# Patient Record
Sex: MALE | Race: WHITE | NOT HISPANIC OR LATINO | Employment: PART TIME | URBAN - METROPOLITAN AREA
[De-identification: names, ages, dates, MRNs, and addresses within clinical notes are randomized per-mention and may not be internally consistent; named-entity substitution may affect disease eponyms.]

---

## 2024-02-13 ENCOUNTER — OFFICE VISIT (OUTPATIENT)
Dept: GASTROENTEROLOGY | Facility: CLINIC | Age: 64
End: 2024-02-13
Payer: COMMERCIAL

## 2024-02-13 VITALS
SYSTOLIC BLOOD PRESSURE: 136 MMHG | DIASTOLIC BLOOD PRESSURE: 79 MMHG | HEART RATE: 65 BPM | HEIGHT: 75 IN | OXYGEN SATURATION: 93 % | WEIGHT: 172 LBS | BODY MASS INDEX: 21.39 KG/M2

## 2024-02-13 DIAGNOSIS — R13.10 DYSPHAGIA, UNSPECIFIED TYPE: Primary | ICD-10-CM

## 2024-02-13 PROCEDURE — 99204 OFFICE O/P NEW MOD 45 MIN: CPT | Performed by: PHYSICIAN ASSISTANT

## 2024-02-13 RX ORDER — ESCITALOPRAM OXALATE 10 MG/1
15 TABLET ORAL DAILY
COMMUNITY

## 2024-02-13 RX ORDER — CHOLECALCIFEROL (VITAMIN D3) 50 MCG
1 TABLET ORAL DAILY
COMMUNITY
End: 2024-03-25 | Stop reason: WASHOUT

## 2024-02-13 RX ORDER — LEVOTHYROXINE SODIUM 100 UG/1
100 TABLET ORAL
COMMUNITY

## 2024-02-13 NOTE — PROGRESS NOTES
Subjective   Patient ID: Harvinder Rider is a 64 y.o. male who was referred by himself for Dysphagia (Colonoscopy done 1/16/2023 by Dr. Mcdonald - no prior EGDs/).    Patient's PCP is BILLY Montague    PMH: hypothryoidism, depression    HPI  Patient states that he has had trouble swallowing for his whole life, but significantly worsened over the past year. He states that when eating or drinking, he starts excessively coughing and feels like he can't get the food or liquid out of his mouth. He states that rarely does food or liquids get stuck in his esophagus. He denies a chronic cough or SOB. He has never had an EGD before. Denies unexplained weight loss, heartburn, N/V, abdominal pain, change in bowel habits, melena, hematochezia. He has never had an EGD before. He denies NSAID use. He reports that his mother had colon cancer. He has a significant family history of Parkinson's in his mother and a grandparent. He is a chronic smoker, most recently 3 PPD.     Prior GI evaluation:  Colonoscopy January 2023 with Dr. Mcdonald: 5 polyps removed (mixture of TA, hyperplastic, and one SSA). Internal hemorrhoids seen also. Repeat colonoscopy recommended in 3 years.    Review of Systems:  Constitutional: No reported fever, chills, or weight loss.  Skin: No reported icterus, lesions, or rash.  Eye: No reported itching, pain, vision changes.  Ear: No reported discharge, hearing loss, or pain.  Nose: No reported congestion, discharge, or epistaxis.  Mouth/throat: +chronic dysphagia  Resp: No reported cough, dyspnea, or wheezing.  Cardiovascular: No reported chest pain, lower extremity edema, or palpitations.   GI: No reported abdominal pain, diarrhea, constipation, change in bowel habits, nausea, or vomiting.  : No reported dysuria, hematuria, or frequency.  Neuro: No reported confusion, memory loss, headaches, or dizziness.  Psych: No reported anxiety, depression, or insomnia.  Musculoskeletal: No reported arthralgia, joint  swelling, or myalgias.  Heme/lymph: No reported easy bleeding or bruising, or swollen lymph nodes.  Endocrine: No reported cold/heat intolerance, polydipsia, or polyuria.     Medications:  Prior to Admission medications    Medication Sig Start Date End Date Taking? Authorizing Provider   cholecalciferol (Vitamin D-3) 50 MCG (2000 UT) tablet Take 1 tablet (2,000 Units) by mouth once daily.    Historical Provider, MD   escitalopram (Lexapro) 10 mg tablet Take 1.5 tablets (15 mg) by mouth once daily.    Historical Provider, MD   levothyroxine (Synthroid, Levoxyl) 100 mcg tablet Take 1 tablet (100 mcg) by mouth once daily in the morning. Take before meals. Take on an empty stomach.    Historical Provider, MD       Allergies:  Sulfamethoxazole-trimethoprim    Past Medical History:  He has no past medical history on file.    Past Surgical History:  He has no past surgical history on file.    Social History:  He reports that he has been smoking cigarettes. He does not have any smokeless tobacco history on file. He reports current alcohol use. He reports that he does not currently use drugs.    Objective   Physical exam:  Constitutional: Well developed, well nourished 64 y.o. year old in no acute distress.   Skin: Warm and dry. Normal turgor. No rash, ulcer, trauma, jaundice.   Eyes: Pupils symmetric and reactive to light.  Respiratory: Clear to auscultation bilaterally. No wheezes, rhonchi, or rales heard.  Cardiovascular: Regular rate and rhythm. S1 and S2 appreciated and normal. No murmur, rub, or gallop heard.   Edema: No edema noted.  GI: Normal bowel sounds. Soft, non-distended, non-tender. No rebound or guarding present. No hepatomegaly or splenomegaly appreciated. Abdominal aorta not palpably abnormal.  Musculoskeletal: Limbs and Joints grossly normal. Full range of motion in major joint.   Neuro: Alert and oriented x 3. Cranial nerves 2-12 grossly intact.   Psych: Normal mood and affect.        Relevant Results  "Recent labs reviewed in the EMR.  Lab Results   Component Value Date    HGB 14.8 07/20/2020    MCV 92 07/20/2020     07/20/2020       Lab Results   Component Value Date    IRON 108 07/20/2020       Lab Results   Component Value Date     07/20/2020    K 4.4 07/20/2020     07/20/2020    BUN 17 07/20/2020    CREATININE 1.15 07/20/2020       Lab Results   Component Value Date    BILITOT 0.6 07/20/2020     Lab Results   Component Value Date    ALT 14 07/20/2020    AST 20 07/20/2020    ALKPHOS 64 07/20/2020       No results found for: \"CRP\"    No results found for: \"CALPS\"    Radiology: Reviewed imaging reviewed in the EMR.  No results found.    Assessment/Plan   Problem List Items Addressed This Visit             ICD-10-CM    Dysphagia - Primary R13.10     Patient with chronic dysphagia, worse over the past 6-12 months. His description of dysphagia appears to be more oropharyngeal. However, he is a smoker and is at risk of Barretts esophagus or esophageal cancer. EGD recommended along with esophagram and modified barium swallow.          Relevant Orders    EGD    FL GI esophagram    FL GI esophagram         Lili Topete PA-C    "

## 2024-02-13 NOTE — PATIENT INSTRUCTIONS
Thank you for coming in for your appointment.    -Get Xrays of swallowing done  -Get EGD done with Dr. Mcdonald    Call with questions or concerns

## 2024-02-13 NOTE — ASSESSMENT & PLAN NOTE
Patient with chronic dysphagia, worse over the past 6-12 months. His description of dysphagia appears to be more oropharyngeal. However, he is a smoker and is at risk of Barretts esophagus or esophageal cancer. EGD recommended along with esophagram and modified barium swallow.

## 2024-02-27 ENCOUNTER — ANESTHESIA EVENT (OUTPATIENT)
Dept: GASTROENTEROLOGY | Facility: HOSPITAL | Age: 64
End: 2024-02-27
Payer: COMMERCIAL

## 2024-02-27 ENCOUNTER — HOSPITAL ENCOUNTER (OUTPATIENT)
Dept: GASTROENTEROLOGY | Facility: HOSPITAL | Age: 64
Discharge: HOME | End: 2024-02-27
Payer: COMMERCIAL

## 2024-02-27 ENCOUNTER — ANESTHESIA (OUTPATIENT)
Dept: GASTROENTEROLOGY | Facility: HOSPITAL | Age: 64
End: 2024-02-27
Payer: COMMERCIAL

## 2024-02-27 VITALS
BODY MASS INDEX: 21.39 KG/M2 | HEIGHT: 75 IN | SYSTOLIC BLOOD PRESSURE: 123 MMHG | TEMPERATURE: 97.2 F | DIASTOLIC BLOOD PRESSURE: 76 MMHG | HEART RATE: 60 BPM | RESPIRATION RATE: 20 BRPM | OXYGEN SATURATION: 98 % | WEIGHT: 172 LBS

## 2024-02-27 DIAGNOSIS — R13.10 DYSPHAGIA, UNSPECIFIED TYPE: Primary | ICD-10-CM

## 2024-02-27 PROCEDURE — 88305 TISSUE EXAM BY PATHOLOGIST: CPT | Performed by: PATHOLOGY

## 2024-02-27 PROCEDURE — 2500000005 HC RX 250 GENERAL PHARMACY W/O HCPCS: Performed by: NURSE ANESTHETIST, CERTIFIED REGISTERED

## 2024-02-27 PROCEDURE — 3700000002 HC GENERAL ANESTHESIA TIME - EACH INCREMENTAL 1 MINUTE

## 2024-02-27 PROCEDURE — 7100000010 HC PHASE TWO TIME - EACH INCREMENTAL 1 MINUTE

## 2024-02-27 PROCEDURE — 7100000009 HC PHASE TWO TIME - INITIAL BASE CHARGE

## 2024-02-27 PROCEDURE — 3700000001 HC GENERAL ANESTHESIA TIME - INITIAL BASE CHARGE

## 2024-02-27 PROCEDURE — 0753T DGTZ GLS MCRSCP SLD LEVEL IV: CPT | Mod: TC,PORLAB | Performed by: INTERNAL MEDICINE

## 2024-02-27 PROCEDURE — 2500000004 HC RX 250 GENERAL PHARMACY W/ HCPCS (ALT 636 FOR OP/ED): Performed by: NURSE ANESTHETIST, CERTIFIED REGISTERED

## 2024-02-27 PROCEDURE — 43239 EGD BIOPSY SINGLE/MULTIPLE: CPT | Performed by: INTERNAL MEDICINE

## 2024-02-27 RX ORDER — SODIUM CHLORIDE 9 MG/ML
INJECTION, SOLUTION INTRAVENOUS CONTINUOUS PRN
Status: DISCONTINUED | OUTPATIENT
Start: 2024-02-27 | End: 2024-02-27

## 2024-02-27 RX ORDER — SODIUM CHLORIDE 9 MG/ML
20 INJECTION, SOLUTION INTRAVENOUS CONTINUOUS
Status: CANCELLED | OUTPATIENT
Start: 2024-02-27

## 2024-02-27 RX ORDER — FENTANYL CITRATE 50 UG/ML
INJECTION, SOLUTION INTRAMUSCULAR; INTRAVENOUS AS NEEDED
Status: DISCONTINUED | OUTPATIENT
Start: 2024-02-27 | End: 2024-02-27

## 2024-02-27 RX ORDER — LIDOCAINE HYDROCHLORIDE 20 MG/ML
INJECTION, SOLUTION INFILTRATION; PERINEURAL AS NEEDED
Status: DISCONTINUED | OUTPATIENT
Start: 2024-02-27 | End: 2024-02-27

## 2024-02-27 RX ORDER — PROPOFOL 10 MG/ML
INJECTION, EMULSION INTRAVENOUS AS NEEDED
Status: DISCONTINUED | OUTPATIENT
Start: 2024-02-27 | End: 2024-02-27

## 2024-02-27 RX ADMIN — LIDOCAINE HYDROCHLORIDE 80 MG: 20 INJECTION, SOLUTION INFILTRATION; PERINEURAL at 07:30

## 2024-02-27 RX ADMIN — FENTANYL CITRATE 50 MCG: 50 INJECTION INTRAMUSCULAR; INTRAVENOUS at 07:30

## 2024-02-27 RX ADMIN — SODIUM CHLORIDE: 0.9 INJECTION, SOLUTION INTRAVENOUS at 07:27

## 2024-02-27 RX ADMIN — PROPOFOL 80 MG: 10 INJECTION, EMULSION INTRAVENOUS at 07:30

## 2024-02-27 RX ADMIN — PROPOFOL 20 MG: 10 INJECTION, EMULSION INTRAVENOUS at 07:32

## 2024-02-27 SDOH — HEALTH STABILITY: MENTAL HEALTH: CURRENT SMOKER: 1

## 2024-02-27 ASSESSMENT — PAIN SCALES - GENERAL
PAINLEVEL_OUTOF10: 0 - NO PAIN
PAIN_LEVEL: 0
PAINLEVEL_OUTOF10: 0 - NO PAIN

## 2024-02-27 ASSESSMENT — PAIN - FUNCTIONAL ASSESSMENT: PAIN_FUNCTIONAL_ASSESSMENT: 0-10

## 2024-02-27 ASSESSMENT — COLUMBIA-SUICIDE SEVERITY RATING SCALE - C-SSRS
6. HAVE YOU EVER DONE ANYTHING, STARTED TO DO ANYTHING, OR PREPARED TO DO ANYTHING TO END YOUR LIFE?: NO
2. HAVE YOU ACTUALLY HAD ANY THOUGHTS OF KILLING YOURSELF?: NO
1. IN THE PAST MONTH, HAVE YOU WISHED YOU WERE DEAD OR WISHED YOU COULD GO TO SLEEP AND NOT WAKE UP?: NO

## 2024-02-27 NOTE — ANESTHESIA POSTPROCEDURE EVALUATION
"Patient: Harvinder Rider \"Darian\"    Procedure Summary       Date: 02/27/24 Room / Location: [facility]    Anesthesia Start: 0727 Anesthesia Stop: 0743    Procedure: EGD Diagnosis: Dysphagia, unspecified type    Scheduled Providers: Lan Mcdonald DO Responsible Provider: FLIP Brannon    Anesthesia Type: MAC ASA Status: 2            Anesthesia Type: MAC    Vitals Value Taken Time   /65 02/27/24 0740   Temp 36.2 °C (97.1 °F) 02/27/24 0740   Pulse 56 02/27/24 0740   Resp 12 02/27/24 0740   SpO2 99 % 02/27/24 0740       Anesthesia Post Evaluation    Patient location during evaluation: PACU  Patient participation: complete - patient cannot participate  Level of consciousness: responsive to verbal stimuli  Pain score: 0  Pain management: adequate  Airway patency: patent  Cardiovascular status: acceptable and hemodynamically stable  Respiratory status: acceptable, spontaneous ventilation and face mask  Hydration status: acceptable  Postoperative Nausea and Vomiting: none        There were no known notable events for this encounter.    "

## 2024-02-27 NOTE — ANESTHESIA PREPROCEDURE EVALUATION
"Patient: Harvinder Rider \"Darian\"    Procedure Information       Date/Time: 02/27/24 0730    Scheduled providers: Lan Mcdonald DO    Procedure: EGD    Location: [facility]            Relevant Problems   Anesthesia (within normal limits)       Clinical information reviewed:   Tobacco  Allergies  Meds   Med Hx  Surg Hx   Fam Hx  Soc Hx        NPO Detail:  NPO/Void Status  Date of Last Liquid: 02/27/24  Time of Last Liquid: 0430  Date of Last Solid: 02/26/24  Time of Last Solid: 1830  Last Intake Type: Clear fluids         Physical Exam    Airway  Mallampati: IV  TM distance: >3 FB  Neck ROM: full     Cardiovascular - normal exam     Dental   (+) upper dentures     Pulmonary - normal exam     Abdominal            Anesthesia Plan    History of general anesthesia?: yes  History of complications of general anesthesia?: no    ASA 2     MAC     The patient is a current smoker.  Patient was previously instructed to abstain from smoking on day of procedure.  Patient smoked on day of procedure.    intravenous induction   Anesthetic plan and risks discussed with patient.  Use of blood products discussed with patient who consented to blood products.    Plan discussed with CRNA.      "

## 2024-02-28 NOTE — ADDENDUM NOTE
Encounter addended by: Susan Lyman RN on: 2/28/2024 10:46 AM   Actions taken: Contacts section saved, Flowsheet accepted

## 2024-03-01 LAB
LABORATORY COMMENT REPORT: NORMAL
PATH REPORT.FINAL DX SPEC: NORMAL
PATH REPORT.GROSS SPEC: NORMAL
PATH REPORT.RELEVANT HX SPEC: NORMAL
PATH REPORT.TOTAL CANCER: NORMAL

## 2024-03-04 ENCOUNTER — APPOINTMENT (OUTPATIENT)
Dept: RADIOLOGY | Facility: HOSPITAL | Age: 64
End: 2024-03-04
Payer: COMMERCIAL

## 2024-03-11 ENCOUNTER — HOSPITAL ENCOUNTER (OUTPATIENT)
Dept: RADIOLOGY | Facility: HOSPITAL | Age: 64
Discharge: HOME | End: 2024-03-11
Payer: COMMERCIAL

## 2024-03-11 DIAGNOSIS — R13.10 DYSPHAGIA, UNSPECIFIED TYPE: ICD-10-CM

## 2024-03-11 PROCEDURE — 74220 X-RAY XM ESOPHAGUS 1CNTRST: CPT

## 2024-03-11 PROCEDURE — 2500000001 HC RX 250 WO HCPCS SELF ADMINISTERED DRUGS (ALT 637 FOR MEDICARE OP): Performed by: PHYSICIAN ASSISTANT

## 2024-03-11 PROCEDURE — 74220 X-RAY XM ESOPHAGUS 1CNTRST: CPT | Performed by: RADIOLOGY

## 2024-03-11 RX ADMIN — ANTACID/ANTIFLATULENT 1 PACKET: 380; 550; 10; 10 GRANULE, EFFERVESCENT ORAL at 10:30

## 2024-03-11 RX ADMIN — BARIUM SULFATE 115 ML: 1.05 SUSPENSION ORAL; RECTAL at 10:30

## 2024-03-11 RX ADMIN — BARIUM SULFATE 700 MG: 700 TABLET ORAL at 10:29

## 2024-03-15 DIAGNOSIS — R13.10 DYSPHAGIA, UNSPECIFIED TYPE: Primary | ICD-10-CM

## 2024-03-25 ENCOUNTER — OFFICE VISIT (OUTPATIENT)
Dept: GASTROENTEROLOGY | Facility: CLINIC | Age: 64
End: 2024-03-25
Payer: COMMERCIAL

## 2024-03-25 VITALS
SYSTOLIC BLOOD PRESSURE: 126 MMHG | BODY MASS INDEX: 22.13 KG/M2 | HEART RATE: 62 BPM | DIASTOLIC BLOOD PRESSURE: 84 MMHG | HEIGHT: 75 IN | WEIGHT: 178 LBS | OXYGEN SATURATION: 99 %

## 2024-03-25 DIAGNOSIS — R13.12 OROPHARYNGEAL DYSPHAGIA: Primary | ICD-10-CM

## 2024-03-25 PROCEDURE — 99213 OFFICE O/P EST LOW 20 MIN: CPT | Performed by: PHYSICIAN ASSISTANT

## 2024-03-25 RX ORDER — NICOTINE 11MG/24HR
2000 PATCH, TRANSDERMAL 24 HOURS TRANSDERMAL
COMMUNITY
Start: 2024-03-24

## 2024-03-25 RX ORDER — LIDOCAINE 40 MG/G
CREAM TOPICAL EVERY 8 HOURS
COMMUNITY

## 2024-03-25 NOTE — ASSESSMENT & PLAN NOTE
Patient's esophagus normal on EGD and esophagram showed only a small hiatal hernia. Patient did have single episode of aspiration with cough. I recommended referral to speech therapy for further evaluation. His dysphagia is more consistent with oropharyngeal than esophageal.

## 2024-03-25 NOTE — PROGRESS NOTES
"Subjective   Patient ID: Harvinder Rider \"Darian\" is a 64 y.o. male who presents for Follow-up (EGD 2/27/24).    Patient's PCP is BILLY Montague    PMH: hypothryoidism, depression    HPI  Patient was initially seen by myself in February 2024. Patient stated that he has had trouble swallowing for his whole life, but significantly worsened over the past year. He states that when eating or drinking, he starts excessively coughing and feels like he can't get the food or liquid out\" of his mouth. He stated that rarely does food or liquids get stuck in his esophagus. He denies a chronic cough or SOB. He denies NSAID use. He reports that his mother had colon cancer. He has a significant family history of Parkinson's in his mother and a grandparent. He is a chronic smoker, most recently 3 PPD.     Due to symptoms, I recommended an EGD for further evaluation that showed mild gastritis. Biopsies were negative for H. Pylor infection. He then had an esophagram and MBS. Esophagram showed just a small hiatal hernia. MBC showed a single episode of aspiration with cough. I had placed a speech therapy referral.     Patient states that he is about the same. He continues to have intermittent trouble swallowing both liquids and solids. Has not seen speech therapist yet. He has gained weight. Denies N/V, abdominal pain, diarrhea, constipation, melena, hematochezia.    Prior GI evaluation:  EGD 2/2024: mild gastritis seen  Colonoscopy January 2023 with Dr. Mcdonald: 5 polyps removed (mixture of TA, hyperplastic, and one SSA). Internal hemorrhoids seen also. Repeat colonoscopy recommended in 3 years.    Review of Systems:  Constitutional: No reported fever, chills, or weight loss.  Mouth/throat: +chronic dysphagia    Medications:  Prior to Admission medications    Medication Sig Start Date End Date Taking? Authorizing Provider   cholecalciferol (Vitamin D-3) 50 MCG (2000 UT) tablet Take 1 tablet (2,000 Units) by mouth once daily.    Historical " Provider, MD   escitalopram (Lexapro) 10 mg tablet Take 1.5 tablets (15 mg) by mouth once daily.    Historical Provider, MD   levothyroxine (Synthroid, Levoxyl) 100 mcg tablet Take 1 tablet (100 mcg) by mouth once daily in the morning. Take before meals. Take on an empty stomach.    Historical Provider, MD       Allergies:  Sulfamethoxazole-trimethoprim    Past Medical History:  He has no past medical history on file.    Past Surgical History:  He has no past surgical history on file.    Social History:  He reports that he has been smoking cigarettes. He has been smoking an average of .5 packs per day. He has never used smokeless tobacco. He reports current alcohol use. He reports that he does not currently use drugs.    Objective   Physical exam:  Constitutional: Well developed, well nourished 64 y.o. year old in no acute distress.   Skin: Warm and dry. Normal turgor. No rash, ulcer, trauma, jaundice.   Eyes: Pupils symmetric and reactive to light.  Respiratory: Clear to auscultation bilaterally. No wheezes, rhonchi, or rales heard.  Cardiovascular: Regular rate and rhythm. S1 and S2 appreciated and normal. No murmur, rub, or gallop heard.   Edema: No edema noted.  GI: Soft, non-distended, non-tender. No rebound or guarding present. No hepatomegaly or splenomegaly appreciated. Abdominal aorta not palpably abnormal.  Musculoskeletal: Limbs and Joints grossly normal. Full range of motion in major joint.   Neuro: Alert and oriented x 3. Cranial nerves 2-12 grossly intact.   Psych: Normal mood and affect.        Relevant Results Recent labs reviewed in the EMR.  Lab Results   Component Value Date    HGB 14.8 07/20/2020    MCV 92 07/20/2020     07/20/2020       Lab Results   Component Value Date    IRON 108 07/20/2020       Lab Results   Component Value Date     07/20/2020    K 4.4 07/20/2020     07/20/2020    BUN 17 07/20/2020    CREATININE 1.15 07/20/2020       Lab Results   Component Value Date     "BILITOT 0.6 07/20/2020     Lab Results   Component Value Date    ALT 14 07/20/2020    AST 20 07/20/2020    ALKPHOS 64 07/20/2020       No results found for: \"CRP\"    No results found for: \"CALPS\"    Radiology: Reviewed imaging reviewed in the EMR.  No results found.    Assessment/Plan   Problem List Items Addressed This Visit             ICD-10-CM    Dysphagia - Primary R13.10     Patient's esophagus normal on EGD and esophagram showed only a small hiatal hernia. Patient did have single episode of aspiration with cough. I recommended referral to speech therapy for further evaluation. His dysphagia is more consistent with oropharyngeal than esophageal.             Lili Topete PA-C    "

## 2024-03-25 NOTE — PATIENT INSTRUCTIONS
Thank you for coming in for your appointment.    -Your esophagus testing has been completely normal. However, you did have an episode of aspiration with thin liquids. I recommend speech therapy for further evaluation.

## 2024-04-02 ENCOUNTER — HOSPITAL ENCOUNTER (EMERGENCY)
Facility: HOSPITAL | Age: 64
Discharge: HOME | End: 2024-04-02
Attending: EMERGENCY MEDICINE
Payer: COMMERCIAL

## 2024-04-02 ENCOUNTER — APPOINTMENT (OUTPATIENT)
Dept: RADIOLOGY | Facility: HOSPITAL | Age: 64
End: 2024-04-02
Payer: COMMERCIAL

## 2024-04-02 ENCOUNTER — APPOINTMENT (OUTPATIENT)
Dept: CARDIOLOGY | Facility: HOSPITAL | Age: 64
End: 2024-04-02
Payer: COMMERCIAL

## 2024-04-02 VITALS
DIASTOLIC BLOOD PRESSURE: 79 MMHG | RESPIRATION RATE: 16 BRPM | SYSTOLIC BLOOD PRESSURE: 119 MMHG | HEIGHT: 75 IN | BODY MASS INDEX: 21.88 KG/M2 | TEMPERATURE: 97.8 F | HEART RATE: 62 BPM | OXYGEN SATURATION: 99 % | WEIGHT: 176 LBS

## 2024-04-02 DIAGNOSIS — K29.00 ACUTE GASTRITIS WITHOUT HEMORRHAGE, UNSPECIFIED GASTRITIS TYPE: Primary | ICD-10-CM

## 2024-04-02 LAB
ALBUMIN SERPL BCP-MCNC: 4.3 G/DL (ref 3.4–5)
ALP SERPL-CCNC: 70 U/L (ref 33–136)
ALT SERPL W P-5'-P-CCNC: 12 U/L (ref 10–52)
ANION GAP SERPL CALC-SCNC: 8 MMOL/L (ref 10–20)
AST SERPL W P-5'-P-CCNC: 21 U/L (ref 9–39)
BASOPHILS # BLD AUTO: 0.04 X10*3/UL (ref 0–0.1)
BASOPHILS NFR BLD AUTO: 0.5 %
BILIRUB SERPL-MCNC: 0.5 MG/DL (ref 0–1.2)
BUN SERPL-MCNC: 13 MG/DL (ref 6–23)
CALCIUM SERPL-MCNC: 9.2 MG/DL (ref 8.6–10.3)
CARDIAC TROPONIN I PNL SERPL HS: 5 NG/L (ref 0–20)
CARDIAC TROPONIN I PNL SERPL HS: 5 NG/L (ref 0–20)
CHLORIDE SERPL-SCNC: 104 MMOL/L (ref 98–107)
CO2 SERPL-SCNC: 29 MMOL/L (ref 21–32)
CREAT SERPL-MCNC: 1.13 MG/DL (ref 0.5–1.3)
EGFRCR SERPLBLD CKD-EPI 2021: 73 ML/MIN/1.73M*2
EOSINOPHIL # BLD AUTO: 0.16 X10*3/UL (ref 0–0.7)
EOSINOPHIL NFR BLD AUTO: 2.1 %
ERYTHROCYTE [DISTWIDTH] IN BLOOD BY AUTOMATED COUNT: 13.5 % (ref 11.5–14.5)
GLUCOSE SERPL-MCNC: 84 MG/DL (ref 74–99)
HCT VFR BLD AUTO: 45.4 % (ref 41–52)
HGB BLD-MCNC: 14.8 G/DL (ref 13.5–17.5)
IMM GRANULOCYTES # BLD AUTO: 0.04 X10*3/UL (ref 0–0.7)
IMM GRANULOCYTES NFR BLD AUTO: 0.5 % (ref 0–0.9)
LACTATE SERPL-SCNC: 0.8 MMOL/L (ref 0.4–2)
LIPASE SERPL-CCNC: 29 U/L (ref 9–82)
LYMPHOCYTES # BLD AUTO: 1.24 X10*3/UL (ref 1.2–4.8)
LYMPHOCYTES NFR BLD AUTO: 16 %
MCH RBC QN AUTO: 30.1 PG (ref 26–34)
MCHC RBC AUTO-ENTMCNC: 32.6 G/DL (ref 32–36)
MCV RBC AUTO: 93 FL (ref 80–100)
MONOCYTES # BLD AUTO: 0.79 X10*3/UL (ref 0.1–1)
MONOCYTES NFR BLD AUTO: 10.2 %
NEUTROPHILS # BLD AUTO: 5.5 X10*3/UL (ref 1.2–7.7)
NEUTROPHILS NFR BLD AUTO: 70.7 %
NRBC BLD-RTO: 0 /100 WBCS (ref 0–0)
PLATELET # BLD AUTO: 229 X10*3/UL (ref 150–450)
POTASSIUM SERPL-SCNC: 4.4 MMOL/L (ref 3.5–5.3)
PROT SERPL-MCNC: 6.7 G/DL (ref 6.4–8.2)
RBC # BLD AUTO: 4.91 X10*6/UL (ref 4.5–5.9)
SODIUM SERPL-SCNC: 137 MMOL/L (ref 136–145)
WBC # BLD AUTO: 7.8 X10*3/UL (ref 4.4–11.3)

## 2024-04-02 PROCEDURE — 2500000001 HC RX 250 WO HCPCS SELF ADMINISTERED DRUGS (ALT 637 FOR MEDICARE OP): Performed by: EMERGENCY MEDICINE

## 2024-04-02 PROCEDURE — 2500000004 HC RX 250 GENERAL PHARMACY W/ HCPCS (ALT 636 FOR OP/ED): Performed by: EMERGENCY MEDICINE

## 2024-04-02 PROCEDURE — 85025 COMPLETE CBC W/AUTO DIFF WBC: CPT | Performed by: NURSE PRACTITIONER

## 2024-04-02 PROCEDURE — 93005 ELECTROCARDIOGRAM TRACING: CPT

## 2024-04-02 PROCEDURE — 99285 EMERGENCY DEPT VISIT HI MDM: CPT | Mod: 25

## 2024-04-02 PROCEDURE — 83690 ASSAY OF LIPASE: CPT | Performed by: NURSE PRACTITIONER

## 2024-04-02 PROCEDURE — 36415 COLL VENOUS BLD VENIPUNCTURE: CPT | Performed by: NURSE PRACTITIONER

## 2024-04-02 PROCEDURE — 84075 ASSAY ALKALINE PHOSPHATASE: CPT | Performed by: NURSE PRACTITIONER

## 2024-04-02 PROCEDURE — 71046 X-RAY EXAM CHEST 2 VIEWS: CPT

## 2024-04-02 PROCEDURE — 2500000004 HC RX 250 GENERAL PHARMACY W/ HCPCS (ALT 636 FOR OP/ED): Performed by: NURSE PRACTITIONER

## 2024-04-02 PROCEDURE — 96375 TX/PRO/DX INJ NEW DRUG ADDON: CPT

## 2024-04-02 PROCEDURE — 84484 ASSAY OF TROPONIN QUANT: CPT | Performed by: NURSE PRACTITIONER

## 2024-04-02 PROCEDURE — 76705 ECHO EXAM OF ABDOMEN: CPT

## 2024-04-02 PROCEDURE — C9113 INJ PANTOPRAZOLE SODIUM, VIA: HCPCS | Performed by: EMERGENCY MEDICINE

## 2024-04-02 PROCEDURE — 76705 ECHO EXAM OF ABDOMEN: CPT | Performed by: RADIOLOGY

## 2024-04-02 PROCEDURE — 83605 ASSAY OF LACTIC ACID: CPT | Performed by: NURSE PRACTITIONER

## 2024-04-02 PROCEDURE — 96374 THER/PROPH/DIAG INJ IV PUSH: CPT

## 2024-04-02 RX ORDER — OMEPRAZOLE 20 MG/1
20 CAPSULE, DELAYED RELEASE ORAL DAILY
Qty: 90 CAPSULE | Refills: 3 | Status: SHIPPED | OUTPATIENT
Start: 2024-04-02 | End: 2024-05-06 | Stop reason: SDUPTHER

## 2024-04-02 RX ORDER — ONDANSETRON HYDROCHLORIDE 2 MG/ML
4 INJECTION, SOLUTION INTRAVENOUS ONCE
Status: COMPLETED | OUTPATIENT
Start: 2024-04-02 | End: 2024-04-02

## 2024-04-02 RX ORDER — FAMOTIDINE 10 MG/ML
20 INJECTION INTRAVENOUS ONCE
Status: COMPLETED | OUTPATIENT
Start: 2024-04-02 | End: 2024-04-02

## 2024-04-02 RX ORDER — PANTOPRAZOLE SODIUM 40 MG/10ML
40 INJECTION, POWDER, LYOPHILIZED, FOR SOLUTION INTRAVENOUS ONCE
Status: COMPLETED | OUTPATIENT
Start: 2024-04-02 | End: 2024-04-02

## 2024-04-02 RX ORDER — DICYCLOMINE HYDROCHLORIDE 20 MG/1
20 TABLET ORAL 2 TIMES DAILY
Qty: 20 TABLET | Refills: 0 | Status: SHIPPED | OUTPATIENT
Start: 2024-04-02 | End: 2024-04-12

## 2024-04-02 RX ADMIN — ALUMINUM HYDROXIDE, MAGNESIUM HYDROXIDE, AND DIMETHICONE 30 ML: 200; 20; 200 SUSPENSION ORAL at 18:05

## 2024-04-02 RX ADMIN — FAMOTIDINE 20 MG: 10 INJECTION, SOLUTION INTRAVENOUS at 15:28

## 2024-04-02 RX ADMIN — ONDANSETRON 4 MG: 2 INJECTION INTRAMUSCULAR; INTRAVENOUS at 15:29

## 2024-04-02 RX ADMIN — PANTOPRAZOLE SODIUM 40 MG: 40 INJECTION, POWDER, FOR SOLUTION INTRAVENOUS at 18:05

## 2024-04-02 ASSESSMENT — PAIN DESCRIPTION - PAIN TYPE: TYPE: ACUTE PAIN

## 2024-04-02 ASSESSMENT — COLUMBIA-SUICIDE SEVERITY RATING SCALE - C-SSRS
1. IN THE PAST MONTH, HAVE YOU WISHED YOU WERE DEAD OR WISHED YOU COULD GO TO SLEEP AND NOT WAKE UP?: NO
2. HAVE YOU ACTUALLY HAD ANY THOUGHTS OF KILLING YOURSELF?: NO
6. HAVE YOU EVER DONE ANYTHING, STARTED TO DO ANYTHING, OR PREPARED TO DO ANYTHING TO END YOUR LIFE?: NO

## 2024-04-02 ASSESSMENT — PAIN SCALES - GENERAL
PAINLEVEL_OUTOF10: 5 - MODERATE PAIN
PAINLEVEL_OUTOF10: 3

## 2024-04-02 ASSESSMENT — PAIN DESCRIPTION - ORIENTATION: ORIENTATION: UPPER;MID

## 2024-04-02 ASSESSMENT — PAIN DESCRIPTION - LOCATION: LOCATION: ABDOMEN

## 2024-04-02 ASSESSMENT — LIFESTYLE VARIABLES
EVER HAD A DRINK FIRST THING IN THE MORNING TO STEADY YOUR NERVES TO GET RID OF A HANGOVER: NO
HAVE YOU EVER FELT YOU SHOULD CUT DOWN ON YOUR DRINKING: NO
TOTAL SCORE: 0
EVER FELT BAD OR GUILTY ABOUT YOUR DRINKING: NO
HAVE PEOPLE ANNOYED YOU BY CRITICIZING YOUR DRINKING: NO

## 2024-04-02 ASSESSMENT — PAIN - FUNCTIONAL ASSESSMENT
PAIN_FUNCTIONAL_ASSESSMENT: 0-10
PAIN_FUNCTIONAL_ASSESSMENT: 0-10

## 2024-04-02 ASSESSMENT — PAIN DESCRIPTION - DESCRIPTORS: DESCRIPTORS: NAGGING

## 2024-04-02 NOTE — ED PROVIDER NOTES
"    HPI   Chief Complaint   Patient presents with    Hernia     Started 3/2024, intermittent pain. Told by [facility] to get checked.        Patient presents to the emergency department for evaluation of epigastric pain.  This happened last night and usually he will eat and it gets better however it did not.  He was diaphoretic at that time however there is no associated fever, URI symptoms, chest pain, shortness of breath, acid reflux, vomiting, diarrhea, constipation, UTI symptoms, back pain, calf pain, leg swelling or rash.  He did not take any over-the-counter intervention for symptoms but he did have some nausea.  He has had a recent GI study and is being managed for his hiatal hernia.  He denies being a frequent drinker, only socially, does not take frequent NSAIDs and has not had any history of cholecystitis, liver problems or peptic ulcer disease.  He denies any history of blood clots, coronary artery disease or lung disease.  His symptoms are persistent and moderate in severity, aggravated by eating and drinking.      History provided by:  Patient   used: No                          No data recorded                  Patient History   History reviewed. No pertinent past medical history.  History reviewed. No pertinent surgical history.  Family History   Problem Relation Name Age of Onset    Colon cancer Mother      Swallowing difficulties Mother's Sister      Swallowing difficulties Maternal Grandmother       Social History     Tobacco Use    Smoking status: Every Day     Current packs/day: 0.50     Types: Cigarettes    Smokeless tobacco: Never   Vaping Use    Vaping status: Some Days   Substance Use Topics    Alcohol use: Yes     Comment: occasionally    Drug use: Not Currently       Physical Exam   Visit Vitals  /79   Pulse 62   Temp 36.6 °C (97.8 °F) (Temporal)   Resp 16   Ht 1.905 m (6' 3\")   Wt 79.8 kg (176 lb)   SpO2 99%   BMI 22.00 kg/m²   Smoking Status Every Day   BSA " 2.05 m²      Physical Exam  Vitals and nursing note reviewed.   Constitutional:       General: He is not in acute distress.     Appearance: He is well-developed.   HENT:      Head: Normocephalic and atraumatic.      Mouth/Throat:      Lips: Pink.      Mouth: Mucous membranes are moist.   Eyes:      Conjunctiva/sclera: Conjunctivae normal.   Cardiovascular:      Rate and Rhythm: Normal rate and regular rhythm.      Pulses:           Radial pulses are 2+ on the left side.      Heart sounds: No murmur heard.  Pulmonary:      Effort: Pulmonary effort is normal.      Breath sounds: Normal breath sounds.   Abdominal:      General: Bowel sounds are normal.      Palpations: Abdomen is soft.      Tenderness: There is abdominal tenderness in the right upper quadrant and epigastric area. There is guarding. There is no right CVA tenderness or left CVA tenderness. Positive signs include Oneil's sign. Negative signs include McBurney's sign.   Genitourinary:     Comments: deferred  Musculoskeletal:         General: No swelling.      Cervical back: Full passive range of motion without pain and neck supple.      Right lower leg: No edema.      Left lower leg: No edema.      Comments: MCKEON randomly   Skin:     General: Skin is warm and dry.      Capillary Refill: Capillary refill takes less than 2 seconds.      Coloration: Skin is not cyanotic or pale.   Neurological:      General: No focal deficit present.      Mental Status: He is alert and oriented to person, place, and time.      Sensory: Sensation is intact.      Motor: Motor function is intact.      Coordination: Coordination is intact.   Psychiatric:         Mood and Affect: Mood normal.         Behavior: Behavior is cooperative.         US right upper quadrant   Final Result   The liver is slightly echogenic suggesting fatty infiltration.        No sonographic evidence of cholelithiasis or cholecystitis.        MACRO:   None        Signed by: Elio Craig 4/2/2024 4:09 PM    Dictation workstation:   [id number]      XR chest 2 views   Final Result   1.  No evidence of acute cardiopulmonary process.                  MACRO:   None        Signed by: Yas Alberts 4/2/2024 4:17 PM   Dictation workstation:   [id number]          Labs Reviewed   COMPREHENSIVE METABOLIC PANEL - Abnormal       Result Value    Glucose 84      Sodium 137      Potassium 4.4      Chloride 104      Bicarbonate 29      Anion Gap 8 (*)     Urea Nitrogen 13      Creatinine 1.13      eGFR 73      Calcium 9.2      Albumin 4.3      Alkaline Phosphatase 70      Total Protein 6.7      AST 21      Bilirubin, Total 0.5      ALT 12     LACTATE - Normal    Lactate 0.8      Narrative:     Venipuncture immediately after or during the administration of Metamizole may lead to falsely low results. Testing should be performed immediately  prior to Metamizole dosing.   LIPASE - Normal    Lipase 29      Narrative:     Venipuncture immediately after or during the administration of Metamizole may lead to falsely low results. Testing should be performed immediately prior to Metamizole dosing.   SERIAL TROPONIN-INITIAL - Normal    Troponin I, High Sensitivity 5      Narrative:     Less than 99th percentile of normal range cutoff-  Female and children under 18 years old <14 ng/L; Male <21 ng/L: Negative  Repeat testing should be performed if clinically indicated.     Female and children under 18 years old 14-50 ng/L; Male 21-50 ng/L:  Consistent with possible cardiac damage and possible increased clinical   risk. Serial measurements may help to assess extent of myocardial damage.     >50 ng/L: Consistent with cardiac damage, increased clinical risk and  myocardial infarction. Serial measurements may help assess extent of   myocardial damage.      NOTE: Children less than 1 year old may have higher baseline troponin   levels and results should be interpreted in conjunction with the overall   clinical context.     NOTE: Troponin I  testing is performed using a different   testing methodology at [facility] than at other   [facility]. Direct result comparisons should only   be made within the same method.   SERIAL TROPONIN, 1 HOUR - Normal    Troponin I, High Sensitivity 5      Narrative:     Less than 99th percentile of normal range cutoff-  Female and children under 18 years old <14 ng/L; Male <21 ng/L: Negative  Repeat testing should be performed if clinically indicated.     Female and children under 18 years old 14-50 ng/L; Male 21-50 ng/L:  Consistent with possible cardiac damage and possible increased clinical   risk. Serial measurements may help to assess extent of myocardial damage.     >50 ng/L: Consistent with cardiac damage, increased clinical risk and  myocardial infarction. Serial measurements may help assess extent of   myocardial damage.      NOTE: Children less than 1 year old may have higher baseline troponin   levels and results should be interpreted in conjunction with the overall   clinical context.     NOTE: Troponin I testing is performed using a different   testing methodology at [facility] than at other   [facility]. Direct result comparisons should only   be made within the same method.   CBC WITH AUTO DIFFERENTIAL    WBC 7.8      nRBC 0.0      RBC 4.91      Hemoglobin 14.8      Hematocrit 45.4      MCV 93      MCH 30.1      MCHC 32.6      RDW 13.5      Platelets 229      Neutrophils % 70.7      Immature Granulocytes %, Automated 0.5      Lymphocytes % 16.0      Monocytes % 10.2      Eosinophils % 2.1      Basophils % 0.5      Neutrophils Absolute 5.50      Immature Granulocytes Absolute, Automated 0.04      Lymphocytes Absolute 1.24      Monocytes Absolute 0.79      Eosinophils Absolute 0.16      Basophils Absolute 0.04     TROPONIN SERIES- (INITIAL, 1 HR)    Narrative:     The following orders were created for panel order Troponin I Series, High Sensitivity (0, 1 HR).  Procedure                                Abnormality         Status                     ---------                               -----------         ------                     Troponin I, High Sensiti...[[id number]]  Normal              Final result               Troponin, High Sensitivi...[[id number]]  Normal              Final result                 Please view results for these tests on the individual orders.         ED Course & MDM   ED Course as of 05/14/24 0601   Tue Apr 02, 2024   1608 WBC: 7.8 [NA]   1608 Lactate: 0.8 [NA]   1608 LIPASE: 29 [NA]   1608 Troponin I, High Sensitivity: 5 [NA]   1626 EKG as interpreted by physician negative for STEMI.  As interpreted by myself shows sinus bradycardia, heart rate 57 bpm, QTc 389 bpm, normal P axis. [NA]   1736 This patient was seen by the advanced practice provider.  I have personally performed a substantive portion of the encounter.  I have seen and examined the patient; agree with the workup, evaluation, MDM, management and diagnosis.  The care plan has been discussed.      I personally saw the patient and made/approved the management plan and take responsibility for the patient management.    History: Briefly patient came here for abdominal pain is ongoing for months.  States where he is staying made him come.  But does have pain with movement of his abdomen.  Denies any recent imaging.  Did have recent EGD that showed gastritis.  Exam: Regular rate and rhythm mild abdominal tenderness    MDM: I independently interpreted study(s) showing CBC shows no leukocytosis, no anemia, no thrombocytopenia.  Troponin is negative x 2.  Lactic is normal at 0.8.  Lipase normal at 29.  Metabolic panel is unremarkable.  Given consistent pain and negative ultrasound will obtain CT for intra-abdominal process.   [JH]   1742 Patient re-evaluated by Dr. Boothe and updated on results. Given his age, CT abdomen and pelvis added.  [NA]   1827 Patient feels better here after Estrella and Protonix.   Patient no longer wants to do CT abdomen pelvis notable for discharged home.  I feel this is reasonable.  Will discharge home with prescription for omeprazole and Bentyl.    No indication for admission.  Discussed findings and diagnosis with the patient, follow-up and return to ED precautions given, patient voiced understanding, agrees with plan, questions answered, patient was discharged in stable condition. []   1828 Patient had not gone to CT and requested discharge as he felt so significantly improved after Estrella cocktail.  Patient discharged by Dr. Boothe. [NA]      ED Course User Index  [JH] Mumtaz Boothe MD  [NA] Leslie Garcia, APRN-CNP         Diagnoses as of 05/14/24 0601   Acute gastritis without hemorrhage, unspecified gastritis type           Medical Decision Making  Patient presents the emergency department for evaluation of epigastric pain.  Differential diagnosis of hiatal hernia, pancreatitis, gastritis, peptic ulcer disease, cholecystitis to mention a few.  He denies any chest pain or shortness of breath however will complete an EKG, high-sensitivity troponin in addition to baseline labs, lipase, lactate and given his Oneil sign and pain isolated to the epigastric and right upper quadrant, will perform a chest x-ray and right upper quadrant ultrasound.  Will provide Pepcid and Zofran for symptom management while patient is in the waiting room awaiting room assignment.  Patient provides consent.    Labs and diagnostics as resulted above with no leukocytosis, anemia, elevated lactate, electrolyte dyscrasia, renal insufficiency or transaminitis.  Lipase is normal at 29.  EKG as interpreted by physician negative for STEMI with high-sensitivity troponin of 5 with negative delta.  Imaging as interpreted by radiologist with right upper quadrant ultrasound showing fatty liver infiltration.  No evidence of cholelithiasis or cholecystitis.  Patient reevaluated by Dr. Boothe and given the patient's age, plan  is for CT of the abdomen pelvis with IV contrast.  Patient amenable.    Patient received a Estrella cocktail and Protonix IV as ordered by Dr. Boothe while awaiting his CT.  Patient ultimately requested discharge home as he felt significantly improved after these medications and did not want to proceed with his imaging.  Patient had shared decision making with the ER attending to cancel this testing and discharge home with outpatient follow-up.    Plan is for Bentyl and omeprazole p.o with continued follow-up with primary care and gastroenterology. Patient is non-toxic, not hypoxic and appropriate for this outpatient management plan which they prefer. Encouragement to arrange close follow up was discussed as well as provided in a written handout of discharge instructions. Patient was educated on signs of symptoms to watch for indicative of re-evaluation in the emergency department setting to include any worsening of current symptoms. They verbalized understanding of instructions and is amenable to this treatment plan with no social determinants of health that would obscure this plan. Patient departed in stable condition.            Your medication list        START taking these medications        Instructions Last Dose Given Next Dose Due   dicyclomine 20 mg tablet  Commonly known as: Bentyl      Take 1 tablet (20 mg) by mouth 2 times a day for 10 days.       omeprazole 20 mg DR capsule  Commonly known as: PriLOSEC      Take 1 capsule (20 mg) by mouth once daily. Do not crush or chew.              ASK your doctor about these medications        Instructions Last Dose Given Next Dose Due   escitalopram 10 mg tablet  Commonly known as: Lexapro           levothyroxine 100 mcg tablet  Commonly known as: Synthroid, Levoxyl           lidocaine 4 % cream  Commonly known as: LMX           Vitamin D3 50 mcg (2,000 unit) capsule  Generic drug: cholecalciferol                     Where to Get Your Medications        These  medications were sent to [facility] ([place]), [place] - [address]  [address], [place] ([place]) [place] [address]      Phone: [phone]   dicyclomine 20 mg tablet  omeprazole 20 mg DR capsule         Procedure  None     *This report was transcribed using voice recognition software.  Every effort was made to ensure accuracy; however, inadvertent computerized transcription errors may be present.*  ERIS Lozano  05/14/24         MICHELE Lozano-CNP  04/02/24 1830       Mumtaz Boothe MD  05/14/24 0601

## 2024-04-05 LAB
ATRIAL RATE: 56 BPM
P AXIS: 74 DEGREES
PR INTERVAL: 197 MS
Q ONSET: 252 MS
QRS COUNT: 9 BEATS
QRS DURATION: 89 MS
QT INTERVAL: 399 MS
QTC CALCULATION(BAZETT): 389 MS
QTC FREDERICIA: 392 MS
R AXIS: 2 DEGREES
T AXIS: 48 DEGREES
T OFFSET: 452 MS
VENTRICULAR RATE: 57 BPM

## 2024-04-24 ENCOUNTER — DOCUMENTATION (OUTPATIENT)
Dept: SPEECH THERAPY | Facility: HOSPITAL | Age: 64
End: 2024-04-24
Payer: COMMERCIAL

## 2024-04-24 NOTE — PROGRESS NOTES
"Speech-Language Pathology                 Therapy Communication Note    Patient Name: Harvinder Rider \"Darian\"  MRN: [id number]  Today's Date: 4/24/2024     Discipline: Speech Language Pathology    Missed Visit Reason:      Missed Time: No Show    Comment:           "

## 2024-05-06 ENCOUNTER — OFFICE VISIT (OUTPATIENT)
Dept: GASTROENTEROLOGY | Facility: CLINIC | Age: 64
End: 2024-05-06
Payer: COMMERCIAL

## 2024-05-06 VITALS
WEIGHT: 181 LBS | DIASTOLIC BLOOD PRESSURE: 82 MMHG | SYSTOLIC BLOOD PRESSURE: 126 MMHG | HEART RATE: 66 BPM | HEIGHT: 75 IN | BODY MASS INDEX: 22.5 KG/M2 | OXYGEN SATURATION: 94 %

## 2024-05-06 DIAGNOSIS — R10.13 EPIGASTRIC PAIN: ICD-10-CM

## 2024-05-06 DIAGNOSIS — R13.12 OROPHARYNGEAL DYSPHAGIA: Primary | ICD-10-CM

## 2024-05-06 DIAGNOSIS — K29.00 ACUTE GASTRITIS WITHOUT HEMORRHAGE, UNSPECIFIED GASTRITIS TYPE: ICD-10-CM

## 2024-05-06 PROCEDURE — 99214 OFFICE O/P EST MOD 30 MIN: CPT | Performed by: PHYSICIAN ASSISTANT

## 2024-05-06 PROCEDURE — 4004F PT TOBACCO SCREEN RCVD TLK: CPT | Performed by: PHYSICIAN ASSISTANT

## 2024-05-06 RX ORDER — OMEPRAZOLE 20 MG/1
20 CAPSULE, DELAYED RELEASE ORAL DAILY
Qty: 30 CAPSULE | Refills: 3 | Status: SHIPPED | OUTPATIENT
Start: 2024-05-06 | End: 2025-05-06

## 2024-05-06 NOTE — ASSESSMENT & PLAN NOTE
"Patient continues to have dysphagia to solids and liquids with \"coughing up particles\" after eating. Barium esophagram showed no evidence of Zenker's diverticulum. I recommended again patient see speech therapy. We also discussed esophageal manometry to evaluate further for dysmotility. He is agreeable. Order placed.  "

## 2024-05-06 NOTE — PATIENT INSTRUCTIONS
Thank you for coming in for your appointment.    -Esophageal manometry testing has been ordered to complete at [facility]  -Continue omeprazole. Refills sent    Call with questions or concerns.

## 2024-05-06 NOTE — PROGRESS NOTES
"Subjective   Patient ID: Harvinder Rider \"Darian\" is a 64 y.o. male who presents for Follow-up (Last OV 3/25/24).    Patient's PCP is BILLY Montague    PMH: hypothryoidism, depression    HPI  Patient was initially seen by myself in February 2024. Patient stated that he has had trouble swallowing for his whole life, but significantly worsened over the past year. He states that when eating or drinking, he starts excessively coughing and feels like he can't get the food or liquid out\" of his mouth. He stated that rarely does food or liquids get stuck in his esophagus. He denies a chronic cough or SOB. He denies NSAID use. He reports that his mother had colon cancer. He has a significant family history of Parkinson's in his mother and a grandparent. He is a chronic smoker, most recently 3 PPD.     Due to symptoms, I recommended an EGD for further evaluation that showed mild gastritis. Biopsies were negative for H. Pylor infection. He then had an esophagram and MBS. Esophagram showed just a small hiatal hernia. MBC showed a single episode of aspiration with cough. I had placed a speech therapy referral, but patient never made it to therapy. He was seen in the ER on 4/2 for epigastric pain. RUQ US was done and showed only a fatty liver. Patient was placed on daily omeprazole.    Patient's dysphagia is the same. He continues to report \"coughing up particles\" after eating, and just excessive coughing with eating and drinking. Patient states that the epigastric pain has resolved with PPI. Denies N/V, melena, hematochezia.    Prior GI evaluation:  RUQ US 4/2/24: fatty liver changes seen  EGD 2/2024: mild gastritis seen  Colonoscopy January 2023 with Dr. Mcdonald: 5 polyps removed (mixture of TA, hyperplastic, and one SSA). Internal hemorrhoids seen also. Repeat colonoscopy recommended in 3 years.    Review of Systems:  Constitutional: No reported fever, chills, or weight loss.  Mouth/throat: +chronic " dysphagia    Medications:  Prior to Admission medications    Medication Sig Start Date End Date Taking? Authorizing Provider   cholecalciferol (Vitamin D-3) 50 MCG (2000 UT) tablet Take 1 tablet (2,000 Units) by mouth once daily.    Historical Provider, MD   escitalopram (Lexapro) 10 mg tablet Take 1.5 tablets (15 mg) by mouth once daily.    Historical Provider, MD   levothyroxine (Synthroid, Levoxyl) 100 mcg tablet Take 1 tablet (100 mcg) by mouth once daily in the morning. Take before meals. Take on an empty stomach.    Historical Provider, MD       Allergies:  Sulfamethoxazole-trimethoprim    Past Medical History:  He has no past medical history on file.    Past Surgical History:  He has no past surgical history on file.    Social History:  He reports that he has been smoking cigarettes. He has never used smokeless tobacco. He reports current alcohol use. He reports that he does not currently use drugs.    Objective   Physical exam:  Constitutional: Well developed, well nourished 64 y.o. year old in no acute distress.   Skin: Warm and dry. Normal turgor. No rash, ulcer, trauma, jaundice.   Eyes: Pupils symmetric and reactive to light.  Respiratory: Clear to auscultation bilaterally. No wheezes, rhonchi, or rales heard.  Cardiovascular: Regular rate and rhythm. S1 and S2 appreciated and normal. No murmur, rub, or gallop heard.   Edema: No edema noted.  GI: Soft, non-distended, non-tender. No rebound or guarding present. No hepatomegaly or splenomegaly appreciated. Abdominal aorta not palpably abnormal.  Musculoskeletal: Limbs and Joints grossly normal. Full range of motion in major joint.   Neuro: Alert and oriented x 3. Cranial nerves 2-12 grossly intact.   Psych: Normal mood and affect.        Relevant Results Recent labs reviewed in the EMR.  Lab Results   Component Value Date    HGB 14.8 04/02/2024    HGB 14.8 07/20/2020    MCV 93 04/02/2024    MCV 92 07/20/2020     04/02/2024     07/20/2020  "      Lab Results   Component Value Date    IRON 108 07/20/2020       Lab Results   Component Value Date     04/02/2024    K 4.4 04/02/2024     04/02/2024    BUN 13 04/02/2024    CREATININE 1.13 04/02/2024       Lab Results   Component Value Date    BILITOT 0.5 04/02/2024     Lab Results   Component Value Date    ALT 12 04/02/2024    ALT 14 07/20/2020    AST 21 04/02/2024    AST 20 07/20/2020    ALKPHOS 70 04/02/2024    ALKPHOS 64 07/20/2020       No results found for: \"CRP\"    No results found for: \"CALPS\"    Radiology: Reviewed imaging reviewed in the EMR.  No results found.    Assessment/Plan   Problem List Items Addressed This Visit             ICD-10-CM    Dysphagia - Primary R13.10     Patient continues to have dysphagia to solids and liquids with \"coughing up particles\" after eating. Barium esophagram showed no evidence of Zenker's diverticulum. I recommended again patient see speech therapy. We also discussed esophageal manometry to evaluate further for dysmotility. He is agreeable. Order placed.         Relevant Orders    Esophageal Manometry    Epigastric pain R10.13     Resolved on omeprazole 20mg daily. Refills sent.          Other Visit Diagnoses         Codes    Acute gastritis without hemorrhage, unspecified gastritis type     K29.00    Relevant Medications    omeprazole (PriLOSEC) 20 mg DR elisa Topete PA-C    "

## 2024-05-22 ENCOUNTER — HOSPITAL ENCOUNTER (OUTPATIENT)
Dept: GASTROENTEROLOGY | Facility: HOSPITAL | Age: 64
Setting detail: OUTPATIENT SURGERY
Discharge: HOME | End: 2024-05-22
Payer: COMMERCIAL

## 2024-05-22 VITALS
HEART RATE: 63 BPM | OXYGEN SATURATION: 97 % | DIASTOLIC BLOOD PRESSURE: 75 MMHG | SYSTOLIC BLOOD PRESSURE: 108 MMHG | RESPIRATION RATE: 16 BRPM

## 2024-05-22 DIAGNOSIS — R13.12 OROPHARYNGEAL DYSPHAGIA: ICD-10-CM

## 2024-05-22 PROCEDURE — 2500000005 HC RX 250 GENERAL PHARMACY W/O HCPCS: Performed by: INTERNAL MEDICINE

## 2024-05-22 PROCEDURE — 91010 ESOPHAGUS MOTILITY STUDY: CPT | Performed by: INTERNAL MEDICINE

## 2024-05-22 PROCEDURE — 91010 ESOPHAGUS MOTILITY STUDY: CPT

## 2024-05-22 RX ORDER — LIDOCAINE HYDROCHLORIDE 20 MG/ML
1 JELLY TOPICAL ONCE
Status: COMPLETED | OUTPATIENT
Start: 2024-05-22 | End: 2024-05-22

## 2024-05-22 RX ADMIN — LIDOCAINE HYDROCHLORIDE 1 APPLICATION: 20 JELLY TOPICAL at 13:21

## 2024-05-22 ASSESSMENT — PAIN SCALES - GENERAL: PAINLEVEL_OUTOF10: 3

## 2024-05-22 ASSESSMENT — PAIN - FUNCTIONAL ASSESSMENT
PAIN_FUNCTIONAL_ASSESSMENT: 0-10
PAIN_FUNCTIONAL_ASSESSMENT: 0-10

## 2024-05-23 PROCEDURE — 91037 ESOPH IMPED FUNCTION TEST: CPT | Performed by: INTERNAL MEDICINE

## 2024-12-21 ENCOUNTER — APPOINTMENT (OUTPATIENT)
Dept: RADIOLOGY | Facility: HOSPITAL | Age: 64
End: 2024-12-21
Payer: COMMERCIAL

## 2024-12-21 ENCOUNTER — HOSPITAL ENCOUNTER (EMERGENCY)
Facility: HOSPITAL | Age: 64
Discharge: ANOTHER HOSPITAL | End: 2024-12-22
Attending: EMERGENCY MEDICINE
Payer: COMMERCIAL

## 2024-12-21 DIAGNOSIS — H33.21 RIGHT RETINAL DETACHMENT: Primary | ICD-10-CM

## 2024-12-21 LAB
ALBUMIN SERPL BCP-MCNC: 3.9 G/DL (ref 3.4–5)
ALP SERPL-CCNC: 74 U/L (ref 33–136)
ALT SERPL W P-5'-P-CCNC: 8 U/L (ref 10–52)
ANION GAP SERPL CALC-SCNC: 10 MMOL/L (ref 10–20)
AST SERPL W P-5'-P-CCNC: 17 U/L (ref 9–39)
BASOPHILS # BLD AUTO: 0.03 X10*3/UL (ref 0–0.1)
BASOPHILS NFR BLD AUTO: 0.3 %
BILIRUB SERPL-MCNC: 0.4 MG/DL (ref 0–1.2)
BUN SERPL-MCNC: 15 MG/DL (ref 6–23)
CALCIUM SERPL-MCNC: 8.8 MG/DL (ref 8.6–10.3)
CHLORIDE SERPL-SCNC: 106 MMOL/L (ref 98–107)
CO2 SERPL-SCNC: 25 MMOL/L (ref 21–32)
CREAT SERPL-MCNC: 0.89 MG/DL (ref 0.5–1.3)
CRP SERPL-MCNC: 1.25 MG/DL
EGFRCR SERPLBLD CKD-EPI 2021: >90 ML/MIN/1.73M*2
EOSINOPHIL # BLD AUTO: 0.16 X10*3/UL (ref 0–0.7)
EOSINOPHIL NFR BLD AUTO: 1.6 %
ERYTHROCYTE [DISTWIDTH] IN BLOOD BY AUTOMATED COUNT: 13.3 % (ref 11.5–14.5)
GLUCOSE SERPL-MCNC: 117 MG/DL (ref 74–99)
HCT VFR BLD AUTO: 42.8 % (ref 41–52)
HGB BLD-MCNC: 14.4 G/DL (ref 13.5–17.5)
IMM GRANULOCYTES # BLD AUTO: 0.04 X10*3/UL (ref 0–0.7)
IMM GRANULOCYTES NFR BLD AUTO: 0.4 % (ref 0–0.9)
LYMPHOCYTES # BLD AUTO: 1 X10*3/UL (ref 1.2–4.8)
LYMPHOCYTES NFR BLD AUTO: 10 %
MCH RBC QN AUTO: 30.7 PG (ref 26–34)
MCHC RBC AUTO-ENTMCNC: 33.6 G/DL (ref 32–36)
MCV RBC AUTO: 91 FL (ref 80–100)
MONOCYTES # BLD AUTO: 0.78 X10*3/UL (ref 0.1–1)
MONOCYTES NFR BLD AUTO: 7.8 %
NEUTROPHILS # BLD AUTO: 7.95 X10*3/UL (ref 1.2–7.7)
NEUTROPHILS NFR BLD AUTO: 79.9 %
NRBC BLD-RTO: 0 /100 WBCS (ref 0–0)
PLATELET # BLD AUTO: 198 X10*3/UL (ref 150–450)
POTASSIUM SERPL-SCNC: 4 MMOL/L (ref 3.5–5.3)
PROT SERPL-MCNC: 6.4 G/DL (ref 6.4–8.2)
RBC # BLD AUTO: 4.69 X10*6/UL (ref 4.5–5.9)
SODIUM SERPL-SCNC: 137 MMOL/L (ref 136–145)
WBC # BLD AUTO: 10 X10*3/UL (ref 4.4–11.3)

## 2024-12-21 PROCEDURE — 70450 CT HEAD/BRAIN W/O DYE: CPT | Performed by: RADIOLOGY

## 2024-12-21 PROCEDURE — 86140 C-REACTIVE PROTEIN: CPT | Performed by: EMERGENCY MEDICINE

## 2024-12-21 PROCEDURE — 70450 CT HEAD/BRAIN W/O DYE: CPT

## 2024-12-21 PROCEDURE — 85652 RBC SED RATE AUTOMATED: CPT | Mod: PORLAB | Performed by: EMERGENCY MEDICINE

## 2024-12-21 PROCEDURE — 80053 COMPREHEN METABOLIC PANEL: CPT | Performed by: EMERGENCY MEDICINE

## 2024-12-21 PROCEDURE — 2500000004 HC RX 250 GENERAL PHARMACY W/ HCPCS (ALT 636 FOR OP/ED): Performed by: STUDENT IN AN ORGANIZED HEALTH CARE EDUCATION/TRAINING PROGRAM

## 2024-12-21 PROCEDURE — 76512 OPH US DX B-SCAN: CPT | Performed by: EMERGENCY MEDICINE

## 2024-12-21 PROCEDURE — 96374 THER/PROPH/DIAG INJ IV PUSH: CPT | Performed by: EMERGENCY MEDICINE

## 2024-12-21 PROCEDURE — 85025 COMPLETE CBC W/AUTO DIFF WBC: CPT | Performed by: EMERGENCY MEDICINE

## 2024-12-21 PROCEDURE — 36415 COLL VENOUS BLD VENIPUNCTURE: CPT | Performed by: EMERGENCY MEDICINE

## 2024-12-21 PROCEDURE — 99285 EMERGENCY DEPT VISIT HI MDM: CPT | Mod: 25 | Performed by: EMERGENCY MEDICINE

## 2024-12-21 RX ORDER — KETOROLAC TROMETHAMINE 30 MG/ML
30 INJECTION, SOLUTION INTRAMUSCULAR; INTRAVENOUS ONCE
Status: COMPLETED | OUTPATIENT
Start: 2024-12-21 | End: 2024-12-21

## 2024-12-21 ASSESSMENT — PAIN - FUNCTIONAL ASSESSMENT: PAIN_FUNCTIONAL_ASSESSMENT: 0-10

## 2024-12-21 ASSESSMENT — LIFESTYLE VARIABLES
HAVE PEOPLE ANNOYED YOU BY CRITICIZING YOUR DRINKING: NO
TOTAL SCORE: 0
EVER FELT BAD OR GUILTY ABOUT YOUR DRINKING: NO
HAVE YOU EVER FELT YOU SHOULD CUT DOWN ON YOUR DRINKING: NO
EVER HAD A DRINK FIRST THING IN THE MORNING TO STEADY YOUR NERVES TO GET RID OF A HANGOVER: NO

## 2024-12-21 ASSESSMENT — PAIN DESCRIPTION - LOCATION: LOCATION: HEAD

## 2024-12-21 ASSESSMENT — VISUAL ACUITY
OS: 20/30
OD: 20/70

## 2024-12-21 ASSESSMENT — PAIN SCALES - GENERAL
PAINLEVEL_OUTOF10: 5 - MODERATE PAIN
PAINLEVEL_OUTOF10: 0 - NO PAIN

## 2024-12-21 ASSESSMENT — PAIN DESCRIPTION - PAIN TYPE: TYPE: ACUTE PAIN

## 2024-12-22 ENCOUNTER — ANESTHESIA EVENT (OUTPATIENT)
Dept: OPERATING ROOM | Facility: HOSPITAL | Age: 64
End: 2024-12-22
Payer: COMMERCIAL

## 2024-12-22 ENCOUNTER — ANESTHESIA (OUTPATIENT)
Dept: OPERATING ROOM | Facility: HOSPITAL | Age: 64
End: 2024-12-22
Payer: COMMERCIAL

## 2024-12-22 ENCOUNTER — HOSPITAL ENCOUNTER (OUTPATIENT)
Facility: HOSPITAL | Age: 64
Setting detail: OBSERVATION
Discharge: HOME | End: 2024-12-22
Attending: STUDENT IN AN ORGANIZED HEALTH CARE EDUCATION/TRAINING PROGRAM
Payer: COMMERCIAL

## 2024-12-22 VITALS
SYSTOLIC BLOOD PRESSURE: 100 MMHG | RESPIRATION RATE: 18 BRPM | HEART RATE: 86 BPM | BODY MASS INDEX: 22.38 KG/M2 | WEIGHT: 180 LBS | TEMPERATURE: 98.1 F | DIASTOLIC BLOOD PRESSURE: 69 MMHG | OXYGEN SATURATION: 96 % | HEIGHT: 75 IN

## 2024-12-22 VITALS
OXYGEN SATURATION: 95 % | WEIGHT: 180 LBS | HEIGHT: 75 IN | DIASTOLIC BLOOD PRESSURE: 58 MMHG | RESPIRATION RATE: 18 BRPM | HEART RATE: 63 BPM | SYSTOLIC BLOOD PRESSURE: 116 MMHG | TEMPERATURE: 97.5 F | BODY MASS INDEX: 22.38 KG/M2

## 2024-12-22 DIAGNOSIS — H33.21 RIGHT RETINAL DETACHMENT: Primary | ICD-10-CM

## 2024-12-22 PROBLEM — K21.9 GASTROESOPHAGEAL REFLUX DISEASE: Status: ACTIVE | Noted: 2024-12-22

## 2024-12-22 PROBLEM — E03.9 HYPOTHYROIDISM: Status: ACTIVE | Noted: 2024-12-22

## 2024-12-22 PROBLEM — F32.A DEPRESSION: Status: ACTIVE | Noted: 2024-12-22

## 2024-12-22 LAB — ERYTHROCYTE [SEDIMENTATION RATE] IN BLOOD BY WESTERGREN METHOD: 15 MM/H (ref 0–20)

## 2024-12-22 PROCEDURE — 3600000008 HC OR TIME - EACH INCREMENTAL 1 MINUTE - PROCEDURE LEVEL THREE: Performed by: OPHTHALMOLOGY

## 2024-12-22 PROCEDURE — 99285 EMERGENCY DEPT VISIT HI MDM: CPT | Mod: 25 | Performed by: STUDENT IN AN ORGANIZED HEALTH CARE EDUCATION/TRAINING PROGRAM

## 2024-12-22 PROCEDURE — 2500000005 HC RX 250 GENERAL PHARMACY W/O HCPCS: Mod: SE | Performed by: OPHTHALMOLOGY

## 2024-12-22 PROCEDURE — A67108 PR REPAIR DETACH RETINA,W VITRECTOMY: Performed by: NURSE ANESTHETIST, CERTIFIED REGISTERED

## 2024-12-22 PROCEDURE — 7100000009 HC PHASE TWO TIME - INITIAL BASE CHARGE: Performed by: OPHTHALMOLOGY

## 2024-12-22 PROCEDURE — 3700000002 HC GENERAL ANESTHESIA TIME - EACH INCREMENTAL 1 MINUTE: Performed by: OPHTHALMOLOGY

## 2024-12-22 PROCEDURE — 3600000003 HC OR TIME - INITIAL BASE CHARGE - PROCEDURE LEVEL THREE: Performed by: OPHTHALMOLOGY

## 2024-12-22 PROCEDURE — 67108 REPAIR DETACHED RETINA: CPT | Performed by: OPHTHALMOLOGY

## 2024-12-22 PROCEDURE — 3700000001 HC GENERAL ANESTHESIA TIME - INITIAL BASE CHARGE: Performed by: OPHTHALMOLOGY

## 2024-12-22 PROCEDURE — 2500000004 HC RX 250 GENERAL PHARMACY W/ HCPCS (ALT 636 FOR OP/ED): Mod: SE | Performed by: NURSE ANESTHETIST, CERTIFIED REGISTERED

## 2024-12-22 PROCEDURE — 99285 EMERGENCY DEPT VISIT HI MDM: CPT | Performed by: STUDENT IN AN ORGANIZED HEALTH CARE EDUCATION/TRAINING PROGRAM

## 2024-12-22 PROCEDURE — A67108 PR REPAIR DETACH RETINA,W VITRECTOMY: Performed by: ANESTHESIOLOGY

## 2024-12-22 PROCEDURE — 2720000007 HC OR 272 NO HCPCS: Performed by: OPHTHALMOLOGY

## 2024-12-22 PROCEDURE — 99140 ANES COMP EMERGENCY COND: CPT | Performed by: ANESTHESIOLOGY

## 2024-12-22 PROCEDURE — 7100000010 HC PHASE TWO TIME - EACH INCREMENTAL 1 MINUTE: Performed by: OPHTHALMOLOGY

## 2024-12-22 PROCEDURE — 2500000004 HC RX 250 GENERAL PHARMACY W/ HCPCS (ALT 636 FOR OP/ED): Mod: SE | Performed by: OPHTHALMOLOGY

## 2024-12-22 RX ORDER — ACETAMINOPHEN 325 MG/1
650 TABLET ORAL EVERY 4 HOURS PRN
Status: ACTIVE | OUTPATIENT
Start: 2024-12-22

## 2024-12-22 RX ORDER — MOXIFLOXACIN 5 MG/ML
1 SOLUTION/ DROPS OPHTHALMIC 4 TIMES DAILY
Qty: 5 ML | Refills: 0 | Status: SHIPPED | OUTPATIENT
Start: 2024-12-22 | End: 2024-12-30 | Stop reason: ALTCHOICE

## 2024-12-22 RX ORDER — PREDNISOLONE ACETATE 10 MG/ML
1 SUSPENSION/ DROPS OPHTHALMIC 4 TIMES DAILY
Qty: 5 ML | Refills: 0 | Status: SHIPPED | OUTPATIENT
Start: 2024-12-22 | End: 2025-01-21

## 2024-12-22 RX ORDER — POVIDONE-IODINE 5 %
SOLUTION, NON-ORAL OPHTHALMIC (EYE) AS NEEDED
Status: DISCONTINUED | OUTPATIENT
Start: 2024-12-22 | End: 2024-12-22 | Stop reason: HOSPADM

## 2024-12-22 RX ORDER — EPINEPHRINE 1 MG/ML
INJECTION, SOLUTION, CONCENTRATE INTRAVENOUS AS NEEDED
Status: DISCONTINUED | OUTPATIENT
Start: 2024-12-22 | End: 2024-12-22 | Stop reason: HOSPADM

## 2024-12-22 RX ORDER — SODIUM CHLORIDE, SODIUM LACTATE, POTASSIUM CHLORIDE, CALCIUM CHLORIDE 600; 310; 30; 20 MG/100ML; MG/100ML; MG/100ML; MG/100ML
50 INJECTION, SOLUTION INTRAVENOUS CONTINUOUS
Status: ACTIVE | OUTPATIENT
Start: 2024-12-22 | End: 2024-12-22

## 2024-12-22 RX ORDER — WATER 1 ML/ML
IRRIGANT IRRIGATION AS NEEDED
Status: DISCONTINUED | OUTPATIENT
Start: 2024-12-22 | End: 2024-12-22 | Stop reason: HOSPADM

## 2024-12-22 RX ORDER — MIDAZOLAM HYDROCHLORIDE 1 MG/ML
INJECTION INTRAMUSCULAR; INTRAVENOUS AS NEEDED
Status: DISCONTINUED | OUTPATIENT
Start: 2024-12-22 | End: 2024-12-22

## 2024-12-22 RX ORDER — DEXAMETHASONE SODIUM PHOSPHATE 10 MG/ML
INJECTION INTRAMUSCULAR; INTRAVENOUS AS NEEDED
Status: DISCONTINUED | OUTPATIENT
Start: 2024-12-22 | End: 2024-12-22 | Stop reason: HOSPADM

## 2024-12-22 RX ORDER — PROPOFOL 10 MG/ML
INJECTION, EMULSION INTRAVENOUS AS NEEDED
Status: DISCONTINUED | OUTPATIENT
Start: 2024-12-22 | End: 2024-12-22

## 2024-12-22 RX ORDER — TRIAMCINOLONE ACETONIDE 40 MG/ML
INJECTION, SUSPENSION INTRA-ARTICULAR; INTRAMUSCULAR AS NEEDED
Status: DISCONTINUED | OUTPATIENT
Start: 2024-12-22 | End: 2024-12-22 | Stop reason: HOSPADM

## 2024-12-22 RX ORDER — OXYCODONE HYDROCHLORIDE 5 MG/1
5 TABLET ORAL EVERY 4 HOURS PRN
Status: ACTIVE | OUTPATIENT
Start: 2024-12-22

## 2024-12-22 RX ORDER — FENTANYL CITRATE 50 UG/ML
INJECTION, SOLUTION INTRAMUSCULAR; INTRAVENOUS AS NEEDED
Status: DISCONTINUED | OUTPATIENT
Start: 2024-12-22 | End: 2024-12-22

## 2024-12-22 RX ORDER — TETRACAINE HYDROCHLORIDE 5 MG/ML
SOLUTION OPHTHALMIC AS NEEDED
Status: DISCONTINUED | OUTPATIENT
Start: 2024-12-22 | End: 2024-12-22 | Stop reason: HOSPADM

## 2024-12-22 RX ORDER — ONDANSETRON HYDROCHLORIDE 2 MG/ML
4 INJECTION, SOLUTION INTRAVENOUS ONCE AS NEEDED
Status: ACTIVE | OUTPATIENT
Start: 2024-12-22

## 2024-12-22 RX ORDER — HYDROMORPHONE HYDROCHLORIDE 0.2 MG/ML
0.2 INJECTION INTRAMUSCULAR; INTRAVENOUS; SUBCUTANEOUS EVERY 5 MIN PRN
Status: ACTIVE | OUTPATIENT
Start: 2024-12-22

## 2024-12-22 RX ADMIN — MIDAZOLAM HYDROCHLORIDE 2 MG: 1 INJECTION, SOLUTION INTRAMUSCULAR; INTRAVENOUS at 08:12

## 2024-12-22 RX ADMIN — SODIUM CHLORIDE, SODIUM LACTATE, POTASSIUM CHLORIDE, AND CALCIUM CHLORIDE: 600; 310; 30; 20 INJECTION, SOLUTION INTRAVENOUS at 08:06

## 2024-12-22 RX ADMIN — FENTANYL CITRATE 50 MCG: 50 INJECTION, SOLUTION INTRAMUSCULAR; INTRAVENOUS at 08:12

## 2024-12-22 RX ADMIN — PROPOFOL 40 MG: 10 INJECTION, EMULSION INTRAVENOUS at 08:13

## 2024-12-22 SDOH — HEALTH STABILITY: MENTAL HEALTH: CURRENT SMOKER: 1

## 2024-12-22 ASSESSMENT — COLUMBIA-SUICIDE SEVERITY RATING SCALE - C-SSRS
2. HAVE YOU ACTUALLY HAD ANY THOUGHTS OF KILLING YOURSELF?: NO
6. HAVE YOU EVER DONE ANYTHING, STARTED TO DO ANYTHING, OR PREPARED TO DO ANYTHING TO END YOUR LIFE?: NO
1. IN THE PAST MONTH, HAVE YOU WISHED YOU WERE DEAD OR WISHED YOU COULD GO TO SLEEP AND NOT WAKE UP?: NO

## 2024-12-22 ASSESSMENT — PAIN SCALES - GENERAL
PAINLEVEL_OUTOF10: 0 - NO PAIN

## 2024-12-22 ASSESSMENT — PAIN - FUNCTIONAL ASSESSMENT
PAIN_FUNCTIONAL_ASSESSMENT: 0-10

## 2024-12-22 NOTE — ANESTHESIA PREPROCEDURE EVALUATION
"Patient: Harvinder Rider \"Darian\"    Procedure Information       Date/Time: 12/22/24 0730    Procedure: Vitrectomy Pars Plana (Right)    [facility]    Surgeons: Robert Silva MD            Relevant Problems   Anesthesia (within normal limits)      Neuro   (+) Depression      GI   (+) Dysphagia   (+) Gastroesophageal reflux disease      Endocrine   (+) Hypothyroidism     Vitals:    12/22/24 0730   BP: 167/88   Pulse: 72   Resp: 18   Temp: 36.6 °C (97.9 °F)   SpO2: 96%       Past Surgical History:   Procedure Laterality Date    EXCISION / REPAIR HYDROCELE PEDIATRIC      TONSILLECTOMY       Past Medical History:   Diagnosis Date    Depression     Dysphagia     Hiatal hernia     Hypothyroid        Current Facility-Administered Medications:     acetaminophen (Tylenol) tablet 650 mg, 650 mg, oral, q4h PRN, Luis Dinge, DO    HYDROmorphone (Dilaudid) injection 0.4 mg, 0.4 mg, intravenous, q5 min PRN, Luis Dinge, DO    HYDROmorphone PF (Dilaudid) injection 0.2 mg, 0.2 mg, intravenous, q5 min PRN, Luis iDnge, DO    lactated Ringer's infusion, 50 mL/hr, intravenous, Continuous, Luis Dinge, DO    ondansetron (Zofran) injection 4 mg, 4 mg, intravenous, Once PRN, Luis Dinge, DO    oxyCODONE (Roxicodone) immediate release tablet 5 mg, 5 mg, oral, q4h PRN, Luis Dinge, DO    oxygen (O2) therapy, , inhalation, Continuous - 02/gases, Luis Mane, DO    Facility-Administered Medications Ordered in Other Encounters:     midazolam (Versed) injection, , intravenous, PRN, MICHELE Chapin-CRNA, 2 mg at 12/22/24 0812  Prior to Admission medications    Medication Sig Start Date End Date Taking? Authorizing Provider   escitalopram (Lexapro) 10 mg tablet Take 1.5 tablets (15 mg) by mouth once daily.    Historical Provider, MD   levothyroxine (Synthroid, Levoxyl) 100 mcg tablet Take 1 tablet (100 mcg) by mouth once daily in the morning. Take " before meals. Take on an empty stomach.    Historical Provider, MD   lidocaine 4 % cream Apply topically every 8 hours.    Historical Provider, MD   omeprazole (PriLOSEC) 20 mg DR capsule Take 1 capsule (20 mg) by mouth once daily. Do not crush or chew. 5/6/24 5/6/25  Lili Topete PA-C   Vitamin D3 50 mcg (2,000 unit) capsule 1 capsule (50 mcg). 3/24/24   Historical Provider, MD     Allergies   Allergen Reactions    Sulfa (Sulfonamide Antibiotics) Hives    Sulfamethoxazole-Trimethoprim Rash     Social History     Tobacco Use    Smoking status: Every Day     Current packs/day: 1.00     Types: Cigarettes    Smokeless tobacco: Never   Substance Use Topics    Alcohol use: Not Currently         Chemistry    Lab Results   Component Value Date/Time     12/21/2024 1925    K 4.0 12/21/2024 1925     12/21/2024 1925    CO2 25 12/21/2024 1925    BUN 15 12/21/2024 1925    CREATININE 0.89 12/21/2024 1925    Lab Results   Component Value Date/Time    CALCIUM 8.8 12/21/2024 1925    ALKPHOS 74 12/21/2024 1925    AST 17 12/21/2024 1925    ALT 8 (L) 12/21/2024 1925    BILITOT 0.4 12/21/2024 1925          Lab Results   Component Value Date/Time    WBC 10.0 12/21/2024 1925    HGB 14.4 12/21/2024 1925    HCT 42.8 12/21/2024 1925     12/21/2024 1925           Clinical information reviewed:   Tobacco  Allergies  Meds   Med Hx  Surg Hx   Fam Hx  Soc Hx        NPO Detail:  NPO/Void Status  Date of Last Liquid: 12/21/24  Time of Last Liquid: 2300  Date of Last Solid: 12/21/24  Time of Last Solid: 2300         Physical Exam    Airway  Mallampati: II  TM distance: >3 FB  Neck ROM: full     Cardiovascular   Rhythm: regular  Rate: normal     Dental   (+) upper dentures  Comments: Missing 17-32   Pulmonary   Breath sounds clear to auscultation     Abdominal            Anesthesia Plan    History of general anesthesia?: yes  History of complications of general anesthesia?: no    ASA 2 - emergent     MAC   (Retrobulbar  block by Ophthalmologist.  Standard monitors, PIV)  The patient is a current smoker.    Anesthetic plan and risks discussed with patient.    Plan discussed with CRNA.

## 2024-12-22 NOTE — ANESTHESIA POSTPROCEDURE EVALUATION
"Patient: Harvinder Rider \"Darian\"    Procedure Summary       Date: 12/22/24 Room / Location: [facility] OR 04 / Virtual [facility] OR    Anesthesia Start: 0806 Anesthesia Stop: 1000    Procedure: Right eye Vitrectomy Pars Plana, Endolaser, C3F8 Gas Insertion (Right) Diagnosis:       Right retinal detachment      (Right retinal detachment [H33.21])    Surgeons: Robert Silva MD Responsible Provider: Luis Mane DO    Anesthesia Type: MAC ASA Status: 2 - Emergent            Anesthesia Type: No value filed.    Vitals Value Taken Time   /58 12/22/24 1100   Temp 36.4 °C (97.5 °F) 12/22/24 0955   Pulse 63 12/22/24 1102   Resp 18 12/22/24 1100   SpO2 90 % 12/22/24 1102   Vitals shown include unfiled device data.    Anesthesia Post Evaluation    Patient location during evaluation: PACU  Patient participation: complete - patient participated  Level of consciousness: awake and alert  Pain management: adequate  Airway patency: patent  Cardiovascular status: acceptable and blood pressure returned to baseline  Respiratory status: acceptable  Hydration status: acceptable  Postoperative Nausea and Vomiting: none        No notable events documented.    "

## 2024-12-22 NOTE — ED PROVIDER NOTES
Emergency Department Provider Note        History of Present Illness     History provided by: Patient and EMS  Limitations to History: None  External Records Reviewed with Brief Summary:  Emergency department note from [facility] as well as transfer note, patient being transported for ophthalmology consult/ evaluation    HPI:  Harvinder Rider is a 64 y.o. male presenting as transfer from [facility] for concerns of right eye retinal detachment.  The patient noted that yesterday his eyes started feeling watery and having wavy vision the peripheries.  He denies any inciting incident.  He denies any pain.  It got worse today so he went to the emergency department.  Patient endorses past medical history of bilateral cataract surgery, daily smoking use, thyroid issues, depression.  He denies any recent trauma to the area    Physical Exam   Triage vitals:  T 36.9 °C (98.5 °F)  HR 62  /84  RR 18  O2 99 % None (Room air)    Physical Exam  Constitutional:       Appearance: Normal appearance.   Eyes:      Comments: Right pupil 2-3mm, left pupil 3 to 4 mm.  Both equal and reactive to light bilaterally without any signs of photophobia.  Extraocular movements intact.  No signs of conjunctival injection   Cardiovascular:      Rate and Rhythm: Normal rate and regular rhythm.   Pulmonary:      Effort: Pulmonary effort is normal. No respiratory distress.   Neurological:      Mental Status: He is alert and oriented to person, place, and time.   Psychiatric:         Mood and Affect: Mood normal.         Behavior: Behavior normal.          Medical Decision Making & ED Course   Medical Decision Makin y.o. male presenting as per HPI.  Patient has no right retinal detachment.  Other differentials that were considered and evaluated upon arrival were glaucoma, vitreous detachment or hemorrhage.  Patient had no pain to the area making glaucoma much less likely.  Patient was comfortable upon arrival, ophthalmology was quickly  consulted who came by to evaluate the patient.  They took the patient up to their ophthalmology clinic for further evaluation.  After evaluating the patient they were comfortable with the patient having a right retinal detachment without other pathology, the noted as the fovea was partially on and partially off and patient had good vision of 2070 comparatively speaking, they wanted to do an emergent surgery.  Patient was made n.p.o., patient was ultimately taken up to the OR with ophthalmology for definitive management.  ----    Differential diagnoses considered include but are not limited to: as per [facility]    Chronic conditions affecting the patient's care: As documented above in [facility]    The patient was discussed with the following consultants/services:  Ophthalmology     Care Considerations: As documented above in [facility]    ED Course:  ED Course as of 12/22/24 0616   Sun Dec 22, 2024   0321 Attending summary:  63 y/o M w/ PMHx hypothyroidism presenting as a transfer for concern for R retinal detachment. Reports blurry vision and drainage from the eye since yesterday. No trauma. No contacts, wears reading glasses. At [place] ED, US done concerning for retinal detachment. Acuity was 20/70 in that eye. Vitals unremarkable here and pt is comfortable appearing. He has scant drainage to lateral R eye, PERRL, EOMI without pain. Will consult ophtho.  [SS]      ED Course User Index  [SS] Lisa Kapoor MD         Diagnoses as of 12/22/24 0616   Right retinal detachment     Disposition   Send to the OR for definitive management with Ophthalmology     Procedures   Procedures    Cyril Dunlap DO  Emergency Medicine      Cyril Dunlap DO  Resident  12/22/24 0619

## 2024-12-22 NOTE — PROGRESS NOTES
Emergency Medicine Transition of Care Note.    I received Harvinder Rider in signout from Dr. Boothe.  Please see the previous ED provider note for all HPI, PE and MDM up to the time of signout. This is in addition to the primary record.    In brief Harvinder Rider is an 64 y.o. male presenting for   Chief Complaint   Patient presents with    loss in visual field since yesterday        ED Course as of 12/21/24 2323   Sat Dec 21, 2024   2258 No answer yet from [facility] ophthalmology team after 1 hour.  Repeat page sent.  Pending callback.  In the meantime, transfer center consulted for ophthalmology at [facility] for input. [JG]   2322 Discussion with ophthalmologist Dr. Baptiste, who recommends transfer to [facility] emergency department for ophthalmology evaluation.  Patient accepted for ED to ED transfer by Dr. Kapoor.  EMTALA forms completed and signed.  Transport arranged and pending.  Patient updated on plan of care and agreeable. [JG]      ED Course User Index  [JG] Tran Tubbs MD         Diagnoses as of 12/21/24 2323   Right retinal detachment       Medical Decision Making  62 yo M presents with loss of medial and peripheral vision in right eye since yesterday. Painless. No prior history of same. He retains central macular vision. Denies any trauma to the eye. He reports wavy vision that appears purple and red. CT head showing no acute intracranial abnormality. Visual acuity right eye 20/70 and left eye 20/30. Ultrasound showing retinal detachment. I subsequently received signout on this patient from Dr. Boothe. [facility] ophthalmologist on call paged, no answer after 1 hour. Repeat page sent. Transfer center subsequently contacted for ophthalmology consult at [facility]. Discussion with ophthalmologist Dr. Baptiste, who recommends transfer to [facility] emergency department for ophthalmology evaluation.  Patient accepted for ED to ED transfer by Dr. Kapoor.  EMTALA forms completed and signed.  Transport  arranged and pending.  Patient updated on plan of care and agreeable.    Final diagnoses:   [H33.21] Right retinal detachment       Procedure  Procedures    Tran Tubbs MD

## 2024-12-22 NOTE — ED PROVIDER NOTES
HPI   Chief Complaint   Patient presents with    loss in visual field since yesterday       Patient presents emergency department with right sided vision loss.  Symptoms started greater than 24 hours ago when he was at work.  Patient is reporting colorful waves in his right vision and substantially impacted peripheral vision loss both medially and laterally of the right eye.  Patient denies any pain, headache, falls.              Patient History   Past Medical History:   Diagnosis Date    Depression     Dysphagia     Hiatal hernia     Hypothyroid      Past Surgical History:   Procedure Laterality Date    EXCISION / REPAIR HYDROCELE PEDIATRIC      TONSILLECTOMY       Family History   Problem Relation Name Age of Onset    Colon cancer Mother      Swallowing difficulties Mother's Sister      Swallowing difficulties Maternal Grandmother       Social History     Tobacco Use    Smoking status: Every Day     Current packs/day: 1.00     Types: Cigarettes    Smokeless tobacco: Never   Vaping Use    Vaping status: Some Days    Substances: Nicotine    Devices: [employer]   Substance Use Topics    Alcohol use: Not Currently    Drug use: Not Currently       Physical Exam   ED Triage Vitals [12/21/24 1813]   Temperature Heart Rate Respirations BP   36.7 °C (98.1 °F) 79 18 123/75      Pulse Ox Temp Source Heart Rate Source Patient Position   98 % Temporal Monitor Sitting      BP Location FiO2 (%)     Left arm --       Physical Exam  Vitals and nursing note reviewed.   Constitutional:       General: He is not in acute distress.     Appearance: He is well-developed.   HENT:      Head: Normocephalic and atraumatic.      Comments: Right medial and lateral peripheral vision loss, centrals retained     Right Ear: External ear normal.      Left Ear: External ear normal.      Mouth/Throat:      Mouth: Mucous membranes are moist.      Pharynx: Oropharynx is clear.   Eyes:      Extraocular Movements: Extraocular movements intact.       Conjunctiva/sclera: Conjunctivae normal.   Neck:      Trachea: Trachea normal.   Cardiovascular:      Rate and Rhythm: Normal rate.   Pulmonary:      Effort: Pulmonary effort is normal. No respiratory distress.      Breath sounds: Normal breath sounds.   Abdominal:      General: Bowel sounds are normal.      Palpations: Abdomen is soft.      Tenderness: There is no abdominal tenderness.   Musculoskeletal:         General: No swelling or tenderness.      Cervical back: No tenderness.   Skin:     General: Skin is warm and dry.      Capillary Refill: Capillary refill takes less than 2 seconds.   Neurological:      General: No focal deficit present.      Mental Status: He is alert and oriented to person, place, and time.   Psychiatric:         Mood and Affect: Mood normal.         Thought Content: Thought content does not include homicidal or suicidal ideation.           ED Course & MDM   ED Course as of 12/22/24 1707   Sat Dec 21, 2024   2258 No answer yet from [facility] ophthalmology team after 1 hour.  Repeat page sent.  Pending callback.  In the meantime, transfer center consulted for ophthalmology at [facility] for input. [JG]   5058 Discussion with ophthalmologist Dr. Baptiste, who recommends transfer to [facility] emergency department for ophthalmology evaluation.  Patient accepted for ED to ED transfer by Dr. Kapoor.  EMTALA forms completed and signed.  Transport arranged and pending.  Patient updated on plan of care and agreeable. [JG]      ED Course User Index  [JG] Tran Tubbs MD         Diagnoses as of 12/22/24 1707   Right retinal detachment                 No data recorded     [place] Coma Scale Score: 15 (12/21/24 1815 : Shahida Triplett RN)                           Medical Decision Making  Patient presents with right eye vision loss with central vision retained. Available chart reviewed. On initial assessment the patient was found non-toxic, no acute distress, vitals hemodynamically stable and  afebrile. Initial concern for retinal detachment, stroke central retinal artery occlusion.  Patient is outside the window for stroke activation.  Only deficit is his vision loss.  CT head is read as negative.  CBC shows no leukocytosis, no anemia.  Metabolic panel is unremarkable.  C-reactive protein slightly elevated.  Ultrasound shows retinal detachment.  Will discuss with on-call ophthalmology. Pt care signed out to oncoming physician        Procedure    Performed by: Mumtaz Boothe MD  Authorized by: Mumtaz Boothe MD              Ocular Indications: vision loss    Procedure: Ocular Ultrasound    Findings:    Retina: The retina was visualized and there was a RETINAL DETACHMENT.        Impression:  Ocular: The ocular US exam was POSITIVE for abnormalities as described.    Comments: Right retinal detachment       Mumtaz Boothe MD  12/22/24 2908

## 2024-12-22 NOTE — H&P
"History Of Present Illness  Harvinder Rider \"Darian\" is a 64 y.o. male presenting with right eye retinal detachment here for pars plana vitrectomy (PPV)/endolaser (EL)/gas insertion right eye.     Past Medical History  Past Medical History:   Diagnosis Date    Depression     Dysphagia     Hiatal hernia     Hypothyroid        Surgical History  Past Surgical History:   Procedure Laterality Date    EXCISION / REPAIR HYDROCELE PEDIATRIC      TONSILLECTOMY          Social History  He reports that he has been smoking cigarettes. He has never used smokeless tobacco. He reports that he does not currently use alcohol. He reports that he does not currently use drugs.    Family History  Family History   Problem Relation Name Age of Onset    Colon cancer Mother      Swallowing difficulties Mother's Sister      Swallowing difficulties Maternal Grandmother          Allergies  Sulfa (sulfonamide antibiotics) and Sulfamethoxazole-trimethoprim    Review of Systems   Constitutional: Negative.    HENT: Negative.     Eyes: Negative.    Respiratory: Negative.     Cardiovascular: Negative.    Gastrointestinal: Negative.    Endocrine: Negative.  .    Neurological: Negative.    Psychiatric/Behavioral: Negative.    Physical Exam   General: Alert and Oriented x3  Head and neck: atraumatic and normacephalic  Lungs: The chest wall is symmetric and without deformity. No signs of respiratory distress. Lung sounds are clear in all lobes bilaterally.   Heart: Regular rate and rhythm.   Abdomen: Soft and non-tender    Last Recorded Vitals  Blood pressure 136/84, pulse 62, temperature 36.9 °C (98.5 °F), temperature source Oral, resp. rate 18, height 1.905 m (6' 3\"), weight 81.6 kg (180 lb), SpO2 99%.    Relevant Results         Assessment/Plan   Assessment & Plan  Right retinal detachment        Harvinder Rider \"Darian\" is a 64 y.o. male presenting with right eye retinal detachment here for pars plana vitrectomy (PPV)/endolaser (EL)/gas insertion " right eye.           Myranda Baptiste MD

## 2024-12-22 NOTE — NURSING NOTE
Discharge instructions reviewed with patient at bedside.  Patient demonstrated active listening and understanding of discharge material. Follow up appointment discussed with patient.

## 2024-12-22 NOTE — DISCHARGE INSTRUCTIONS
Postop instructions:  Keep shield and patch over right eye until tomorrow.  Please maintain face-down positioning 45 mins every hour for the next 1-2 days, and sleep face-down.  No eye rubbing, no bending, lifting, straining for 2 weeks    Tomorrow 12/23/24 Dr. Silva will see you at the [facility] [place] at 1:30 pm [id number] [facility] [id number]    Prednisolone acetate drops:  4 times/day for 1 week  3 times/day for 1 week  2 times/day for 1 week  Once/day for 1 week    Moxifloxacin  4 times per day for 2 weeks.    Sleep with shield at night for 7 days  No eye rubbing  May shower and wash her face but no water inside surgery eye

## 2024-12-22 NOTE — OP NOTE
"Right eye Vitrectomy Pars Plana, Endolaser, C3F8 Gas Insertion (R) Operative Note     Date: 2024  OR Location: [facility] OR    Name: Harvinder Rider \"Darian\", : 1960, Age: 64 y.o., MRN: [id number], Sex: male    Diagnosis  Pre-op Diagnosis      * Right retinal detachment [H33.21] Post-op Diagnosis     * Right retinal detachment [H33.21]     Procedures  Right eye Vitrectomy Pars Plana, Endolaser, C3F8 Gas Insertion  22457 - [place] VITRECTOMY MECHANICAL PARS PLANA      Surgeons      * Robert Silva - Primary    Resident/Fellow/Other Assistant:  Surgeons and Role:     * Myranda Baptiste MD - Resident - Assisting    Staff:   Circulator: Kiki Fermin Person: Erika    Anesthesia Staff: Anesthesiologist: Luis Mane DO  CRNA: MICHELE Chapin-CRNA    Procedure Summary  Anesthesia: Anesthesia type not filed in the log.  ASA: ASA status not filed in the log.  Estimated Blood Loss: <5mL  Intra-op Medications:   Administrations occurring from 0730 to 1040 on 24:   Medication Name Total Dose   povidone-iodine 5 % ophthalmic solution 1 Application   balanced salts (BSS) intraocular solution 515 mL   tobramycin-dexamethasone (Tobradex) ophthalmic ointment 0.5 inch   chondroitin sulf-sod hyaluron (Viscoat) intraocular injection 0.5 mL   triamcinolone acetonide (Kenalog-40) injection 4 mg   tetracaine (PF) 0.5 % ophthalmic solution 1 drop   EPINEPHrine HCl (PF) (Adrenalin) injection 1 mg   dexAMETHasone (Decadron) injection 10 mg   sterile water irrigation solution 200 mL   fentaNYL (Sublimaze) injection 50 mcg/mL 50 mcg   LR bolus Cannot be calculated   midazolam PF (Versed) injection 1 mg/mL 2 mg   propofol (Diprivan) injection 10 mg/mL 40 mg              Anesthesia Record               Intraprocedure I/O Totals          Intake    LR bolus 100.00 mL    Total Intake 100 mL          Specimen: No specimens collected              Drains and/or Catheters: * None in log *    Tourniquet Times: none        Implants: " "none    Findings: As detailed below    Indications: Harvinder Rider \"Darian\" is an 64 y.o. male who is having surgery for Right retinal detachment [H33.21].     The patient was seen in the preoperative area. The risks, benefits, complications, treatment options, non-operative alternatives, expected recovery and outcomes were discussed with the patient. The possibilities of reaction to medication, pulmonary aspiration, injury to surrounding structures, bleeding, recurrent infection, the need for additional procedures, failure to diagnose a condition, and creating a complication requiring transfusion or operation were discussed with the patient. The patient concurred with the proposed plan, giving informed consent.  The site of surgery was properly noted/marked if necessary per policy. The patient has been actively warmed in preoperative area. Preoperative antibiotics are not indicated. Venous thrombosis prophylaxis are not indicated.    Procedure Details:     Mr. Rider is a 64 y.o. male with a macula on rhegmatogenous detachment with single break - right eye. This was causing decreased visual function. The risks, benefits, and alternatives to the procedure were discussed with the patient including the risk for vision loss, blindness, retinal detachment, infection, bleeding, pain, high or low pressure in the eye, double vision, no benefit, need for additional procedures, and droopy eyelid, amongst others. The patient had a full opportunity to have all questions answered in clinic prior to surgery. Afterwards, the patient requested that we perform surgery and signed the consent form.      PROCEDURE:   The patient was brought to the preoperative holding area where the correct eye was confirmed and marked. The patient was then brought to the operating room where a secondary time-out was performed to identify the correct patient, eye, procedure, and any allergies. The patient then underwent intravenous sedation by the " anesthesia team followed by a block as described above. The eye was prepped and draped in the usual sterile ophthalmic fashion followed by placement of a lid speculum.      A 25-gauge trocar was placed in the inferotemporal quadrant 3.5 mm posterior to the limbus after conjunctival displacement.  Peritomies  were made at 7 and 3 oclock to decompress bullous conjunctiva remaining after retrobulbar block.  A 4 mm infusion cannula was placed through this trocar, and the infusion cannula was confirmed in the vitreous cavity prior to turning it on. Two additional 25-gauge trocars were placed in a similar fashion in the superonasal and superotemporal quadrants 3.5 mm posterior to the limbus after conjunctival displacement. At this time, a standard three-port pars plana vitrectomy was performed using the light pipe, the cutter, and the BIOM viewing system. A core vitreous dissection was performed. The retina was inspected and was found to be detached from 3 to 11 o clock. . The macula was attached. A small retinal tear was noted at 12 with associated area of lattice degeneration at 1. A prior posterior vitreous detachment was confirmed with kenalog administration over the optic nerve.  A thorough peripheral vitreous dissection was performed. All vitreous traction was relieved around the identified tears. The tears were demarcated with intravitreal diathermy. A posterior drainage retinotomy was made with intravitreal diathermy within the superotemporal midperiphery in the area of the most bullous subretinal fluid. Afterwards, a complete fluid-air exchange was performed using the soft tip cannula draining subretinal fluid through the retinotomy until the retina flatten. Residual fluid was removed from the posterior pole over the optic nerve using the soft tip cannula. The endolaser probe was brought onto the field and was used to perform 2-3 rows of confluent barrier laser at the edges of the posterior retinotomy, the  retinal tear and lattice degeneration. The barrier laser was then carried 360 degrees posterior to the vitreous base for additional support. Laser Settings: Power- 200-300mW, Duration: 100ms, Interval: 50-100ms, Shot Count; 3029, Total Energy: 76.88 Joules.      The superonasal trocar was removed and the sclerotomy was  air-tight  An exchange of 12% C3F8 gas was then performed through the infusion cannula and vented through the superotemporal trocar. The superotemporal trocar was removed and the sclerotomy was secured with a 7-0 vicryl suture. The infusion cannula and associated trocar were removed and the sclerotomy were sealed with 7-0 vicryl suture. All areas were found to be gas-tight and peritomies were closed with 2 simple interrupted vicryl sutures at 7 and 3 oclock.  The eye was confirmed to be at a physiologic level by digital palpation.     Subconjunctival injection of Cefazolin and Dexamethasone were administered. The lid speculum and drapes were removed. Antibiotic ointment was applied. The eye was patched and shielded. The patient tolerated the procedure well without any intraoperative or immediate postoperative complications. The patient was taken to the recovery room in good condition. The patient was instructed to maintain a strict face-down position. The patient will follow up with the resident physician on-call in clinic on the following morning.        Complications:  None; patient tolerated the procedure well.    Disposition: PACU - hemodynamically stable.  Condition: stable                 Additional Details:     Attending Attestation:     Robert Silva  Phone Number: [phone]

## 2024-12-22 NOTE — ED TRIAGE NOTES
Pt is a 65yo male, presenting to [facility] ED as a transfer from [facility]. Pt reported to have a Right eye retinal detachment, to be consulted by opthalmology. Pt is AO4, VSS. Pt reports no pain, blurry vision with minimal vision loss.

## 2024-12-22 NOTE — CONSULTS
Reason for consult: curtain of vision loss right eye     HPI:  65 yo male with PMHx of hypothyroidism; past ocular hx of cataract surgery both eyes presents as a transfer from  [place] for curtain of vision loss coming over right eye in inferotemporal visual field. Denies any flashes or new floaters. No recent eye or head trauma.       ROS: Patient denies pain, redness, discharge, double vision, blind spots, flashes, curtain coming over vision, or new floaters.     Ophthalmic drops/medications: none   Prior intraocular surgery or refractive surgery: CEIOL OU      PMHx: please refer to admission history and physical   Medications: please refer to medication reconciliation  Allergies: please refer to patient allergy list  Past Ocular History: as per above HPI  Family History: reviewed and noncontributory to chief ophthalmic complaint    Examination:   Base Eye Exam       Visual Acuity (Snellen - Linear)         Right Left    Near cc 20/70 phni 20/25 phni      Correction: Glasses              Tonometry (Tonopen)         Right Left    Pressure 8 8              Pupils         Pupils Dark Light Shape React APD    Right PERRL, No APD 3 2 Round Brisk None    Left PERRL, No APD 3 2 Round Brisk None              Visual Fields (Counting fingers)         Left Right     Full                                 Extraocular Movement         Right Left     Full Full              Neuro/Psych       Oriented x3: Yes    Mood/Affect: Normal              Dilation       Both eyes: 1% Mydriacyl & 2.5% Newton                    Additional Tests       Color         Right Left    Ishihara 10/11 10/11                  Slit Lamp and Fundus Exam       External Exam         Right Left    External Normal Normal              Slit Lamp Exam         Right Left    Lids/Lashes Normal Normal    Conjunctiva/Sclera White and quiet White and quiet    Cornea Clear Clear    Anterior Chamber Deep and quiet Deep and quiet    Iris Round and reactive Round and  reactive    Lens PCIOL PCIOL    Anterior Vitreous Vitreous syneresis, Patterson's sign Vitreous syneresis              Fundus Exam         Right Left    Disc Normal Normal    C/D Ratio 0.3 0.3    Macula superior retinal detachment approaching macula Normal    Vessels Normal Normal    Periphery superior retinal detachment approaching macula Normal                    Imaging/studies:   Macula oct 12/22/24   OD: fovea splitting retinal detachment with superior macula detached, no IRF, tr epiretinal membrane    Assessment and Plan:  63 yo male with PMHx of hypothyroidism; past ocular hx of cataract surgery both eyes presents as a transfer from [facility] for curtain of vision loss coming over right eye in inferotemporal visual field. Denies any flashes or new floaters. No recent eye or head trauma. Exam notable for superior billowing retinal detachment which appears to be fovea splitting on macula OCT.     # Fovea splitting retinal detachment, right eye   - superior billowing retinal detachment which approaches macula and appears to be fovea splitting on mac OCT with nVAcc 20/70 OD   - no tears/breaks/holes seen on scleral depressed exam although patient poorly tolerated examination   - counseled patient regarding guarded prognosis of fovea splitting detachment  - will plan for pars plana vitrectomy (PPV) with retinal detachment repair today given relatively good VA at this time   - keep patient NPO     - Patient's contact number: [phone]      Discussed and examined with Myranda Baptiste MD (PGY-4). Discussed with Robert Silva MD (retina attending).     Mike Garcia MD   Ophthalmology PGY-2    -----------------------------  Ophthalmology Adult Pager - [phone]  Ophthalmology Pediatrics Pager - [phone] (weekdays 8 am - 5 pm)     For adult follow-up appointments, call: [phone]  For pediatric follow-up appointments, call: [phone]    NOTE: This note is not finalized until attending reviews and signs.

## 2024-12-23 ENCOUNTER — OFFICE VISIT (OUTPATIENT)
Dept: OPHTHALMOLOGY | Facility: CLINIC | Age: 64
End: 2024-12-23
Payer: COMMERCIAL

## 2024-12-23 DIAGNOSIS — H33.21 RIGHT RETINAL DETACHMENT: Primary | ICD-10-CM

## 2024-12-23 PROCEDURE — 99024 POSTOP FOLLOW-UP VISIT: CPT | Performed by: OPHTHALMOLOGY

## 2024-12-23 ASSESSMENT — VISUAL ACUITY
METHOD: SNELLEN - LINEAR
OD_SC: LP

## 2024-12-23 ASSESSMENT — CUP TO DISC RATIO: OD_RATIO: 0.3

## 2024-12-23 ASSESSMENT — EXTERNAL EXAM - RIGHT EYE: OD_EXAM: NORMAL

## 2024-12-23 ASSESSMENT — TONOMETRY
OD_IOP_MMHG: 16
IOP_METHOD: TONOPEN

## 2024-12-23 ASSESSMENT — EXTERNAL EXAM - LEFT EYE: OS_EXAM: NORMAL

## 2024-12-23 ASSESSMENT — SLIT LAMP EXAM - LIDS: COMMENTS: NORMAL
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
## 2024-12-23 NOTE — PROGRESS NOTES
Assessment/Plan   Diagnoses and all orders for this visit:  Right retinal detachment      Today POD1 s/p PPV/EL/C3F8 OD 12/22/24 for Retinal Detachment OD    - Start Pred QID  - Start Vigamox QID  -Activity restrictions  -Upright positioning  - RTC 1 week - will arrange with Dr. Yuen

## 2024-12-30 ENCOUNTER — APPOINTMENT (OUTPATIENT)
Dept: OPHTHALMOLOGY | Facility: CLINIC | Age: 64
End: 2024-12-30
Payer: COMMERCIAL

## 2024-12-30 DIAGNOSIS — H33.21 RIGHT RETINAL DETACHMENT: Primary | ICD-10-CM

## 2024-12-30 PROCEDURE — 99024 POSTOP FOLLOW-UP VISIT: CPT | Performed by: OPHTHALMOLOGY

## 2024-12-30 PROCEDURE — 92250 FUNDUS PHOTOGRAPHY W/I&R: CPT | Mod: RIGHT SIDE | Performed by: STUDENT IN AN ORGANIZED HEALTH CARE EDUCATION/TRAINING PROGRAM

## 2024-12-30 RX ORDER — ERYTHROMYCIN 5 MG/G
OINTMENT OPHTHALMIC 4 TIMES DAILY
Qty: 3.5 G | Refills: 0 | Status: SHIPPED | OUTPATIENT
Start: 2024-12-30 | End: 2025-01-06

## 2024-12-30 ASSESSMENT — TONOMETRY
IOP_METHOD: GOLDMANN APPLANATION
OD_IOP_MMHG: 12

## 2024-12-30 ASSESSMENT — CONF VISUAL FIELD
OD_SUPERIOR_TEMPORAL_RESTRICTION: 0
OD_INFERIOR_TEMPORAL_RESTRICTION: 0
OD_NORMAL: 1
OD_INFERIOR_NASAL_RESTRICTION: 0
OD_SUPERIOR_NASAL_RESTRICTION: 0

## 2024-12-30 ASSESSMENT — SLIT LAMP EXAM - LIDS: COMMENTS: NORMAL

## 2024-12-30 ASSESSMENT — EXTERNAL EXAM - RIGHT EYE: OD_EXAM: NORMAL

## 2024-12-30 ASSESSMENT — VISUAL ACUITY
METHOD: SNELLEN - LINEAR
OD_SC: CF 2'

## 2024-12-30 ASSESSMENT — CUP TO DISC RATIO: OD_RATIO: 0.3

## 2024-12-30 ASSESSMENT — EXTERNAL EXAM - LEFT EYE: OS_EXAM: NORMAL

## 2024-12-30 NOTE — PROGRESS NOTES
Assessment/Plan   Diagnoses and all orders for this visit:  Right retinal detachment  -     Color Fundus Photography - OD - Right Eye      Today POW#1 s/p PPV/EL/C3F8 OD 12/22/24 for Mac-On Rhegmatogenous Retinal Detachment OD  VF CF, IOP WNL 12  Retina attached, gas fill 80%    - Start Pred QID for 1 more week then taper weekly  - stop Vigamox QID  - start erythromycin shannan QID PRN for irritation related to suture irritation  -Activity restrictions reviewed  - RTC 2-3 weeks with Dr. Silva

## 2025-01-30 ENCOUNTER — APPOINTMENT (OUTPATIENT)
Dept: OPHTHALMOLOGY | Facility: CLINIC | Age: 65
End: 2025-01-30
Payer: COMMERCIAL

## 2025-01-30 DIAGNOSIS — H33.21 RIGHT RETINAL DETACHMENT: Primary | ICD-10-CM

## 2025-01-30 PROCEDURE — 99024 POSTOP FOLLOW-UP VISIT: CPT | Performed by: OPHTHALMOLOGY

## 2025-01-30 PROCEDURE — 92134 CPTRZ OPH DX IMG PST SGM RTA: CPT | Mod: BILATERAL PROCEDURE | Performed by: OPHTHALMOLOGY

## 2025-01-30 ASSESSMENT — ENCOUNTER SYMPTOMS
MUSCULOSKELETAL NEGATIVE: 0
RESPIRATORY NEGATIVE: 0
CONSTITUTIONAL NEGATIVE: 0
HEMATOLOGIC/LYMPHATIC NEGATIVE: 0
CARDIOVASCULAR NEGATIVE: 0
ENDOCRINE NEGATIVE: 0
EYES NEGATIVE: 0
PSYCHIATRIC NEGATIVE: 0
NEUROLOGICAL NEGATIVE: 0
ALLERGIC/IMMUNOLOGIC NEGATIVE: 0
GASTROINTESTINAL NEGATIVE: 0

## 2025-01-30 ASSESSMENT — TONOMETRY
IOP_METHOD: GOLDMANN APPLANATION
OD_IOP_MMHG: 12

## 2025-01-30 ASSESSMENT — VISUAL ACUITY
OD_PH_SC+: +1
OD_PH_SC: 20/50
OD_SC: 20/150
CORRECTION_TYPE: GLASSES
METHOD: SNELLEN - LINEAR

## 2025-01-30 ASSESSMENT — CUP TO DISC RATIO: OD_RATIO: 0.3

## 2025-01-30 ASSESSMENT — SLIT LAMP EXAM - LIDS: COMMENTS: NORMAL

## 2025-01-30 ASSESSMENT — EXTERNAL EXAM - LEFT EYE: OS_EXAM: NORMAL

## 2025-01-30 ASSESSMENT — EXTERNAL EXAM - RIGHT EYE: OD_EXAM: NORMAL

## 2025-01-30 NOTE — PROGRESS NOTES
Assessment/Plan   There are no diagnoses linked to this encounter.    Today POM#1 s/p PPV/EL/C3F8 OD 12/22/24 for Mac-On Rhegmatogenous Retinal Detachment OD  -BCVA 20/50, IOP 12  -Mild injection OD - suspect irritation   -Off drops (gtts)  -40% gas fill  -Retina attached    3 weeks sooner PRN

## 2025-03-09 NOTE — PROGRESS NOTES
Imaging    Macula OCT 03/12/25  Right eye (OD): Normal contour and appearance, no IRF/subretinal fluid (SRF)  Left eye (OS): Normal contour and appearance, no IRF/subretinal fluid (SRF)      Assessment/Plan   There are no diagnoses linked to this encounter.    Today POM#2.5 s/p PPV/EL/C3F8 OD 12/22/24 for Mac-On Rhegmatogenous Retinal Detachment OD    -BCVA 20/40, IOP 15  -Off drops (gtts)  -Bubble resolved  -Retina attached  -Patient doing well  -Follow up 3 months sooner PRN

## 2025-03-10 ENCOUNTER — APPOINTMENT (OUTPATIENT)
Dept: OPHTHALMOLOGY | Facility: CLINIC | Age: 65
End: 2025-03-10
Payer: COMMERCIAL

## 2025-03-10 DIAGNOSIS — H33.21 RIGHT RETINAL DETACHMENT: Primary | ICD-10-CM

## 2025-03-10 PROCEDURE — 99024 POSTOP FOLLOW-UP VISIT: CPT | Performed by: OPHTHALMOLOGY

## 2025-03-10 PROCEDURE — 92134 CPTRZ OPH DX IMG PST SGM RTA: CPT | Performed by: OPHTHALMOLOGY

## 2025-03-10 ASSESSMENT — TONOMETRY
OS_IOP_MMHG: 14
IOP_METHOD: GOLDMANN APPLANATION
OD_IOP_MMHG: 15

## 2025-03-10 ASSESSMENT — VISUAL ACUITY
METHOD: SNELLEN - LINEAR
OS_SC: 20/20
OD_SC: 20/80
OD_PH_SC: 20/40

## 2025-03-10 NOTE — LETTER
March 10, 2025     Patient: Harvinder Rider   YOB: 1960   Date of Visit: 3/10/2025       To Whom It May Concern:    It is my medical opinion that Harvinder Rider may return to full duty immediately with no restrictions.    If you have any questions or concerns, please don't hesitate to call.         Sincerely,        Robert Silva MD    CC: No Recipients

## 2025-03-12 ASSESSMENT — CUP TO DISC RATIO
OS_RATIO: 0.3
OD_RATIO: 0.3

## 2025-03-12 ASSESSMENT — SLIT LAMP EXAM - LIDS
COMMENTS: NORMAL
COMMENTS: NORMAL

## 2025-03-12 ASSESSMENT — EXTERNAL EXAM - LEFT EYE: OS_EXAM: NORMAL

## 2025-03-12 ASSESSMENT — EXTERNAL EXAM - RIGHT EYE: OD_EXAM: NORMAL

## 2025-04-19 ENCOUNTER — APPOINTMENT (OUTPATIENT)
Dept: RADIOLOGY | Facility: HOSPITAL | Age: 65
End: 2025-04-19
Payer: COMMERCIAL

## 2025-04-19 ENCOUNTER — HOSPITAL ENCOUNTER (EMERGENCY)
Facility: HOSPITAL | Age: 65
Discharge: HOME | End: 2025-04-19
Attending: STUDENT IN AN ORGANIZED HEALTH CARE EDUCATION/TRAINING PROGRAM
Payer: COMMERCIAL

## 2025-04-19 VITALS
BODY MASS INDEX: 23.46 KG/M2 | HEIGHT: 73 IN | TEMPERATURE: 99 F | OXYGEN SATURATION: 98 % | HEART RATE: 75 BPM | WEIGHT: 177 LBS | SYSTOLIC BLOOD PRESSURE: 117 MMHG | DIASTOLIC BLOOD PRESSURE: 71 MMHG | RESPIRATION RATE: 18 BRPM

## 2025-04-19 DIAGNOSIS — R19.7 DIARRHEA, UNSPECIFIED TYPE: Primary | ICD-10-CM

## 2025-04-19 LAB
ALBUMIN SERPL BCP-MCNC: 4.7 G/DL (ref 3.4–5)
ALP SERPL-CCNC: 77 U/L (ref 33–136)
ALT SERPL W P-5'-P-CCNC: 17 U/L (ref 10–52)
ANION GAP SERPL CALC-SCNC: 16 MMOL/L (ref 10–20)
APPEARANCE UR: CLEAR
AST SERPL W P-5'-P-CCNC: 26 U/L (ref 9–39)
BASOPHILS # BLD AUTO: 0.02 X10*3/UL (ref 0–0.1)
BASOPHILS NFR BLD AUTO: 0.3 %
BILIRUB SERPL-MCNC: 0.5 MG/DL (ref 0–1.2)
BILIRUB UR STRIP.AUTO-MCNC: NEGATIVE MG/DL
BUN SERPL-MCNC: 28 MG/DL (ref 6–23)
C DIF TOX TCDA+TCDB STL QL NAA+PROBE: NOT DETECTED
CALCIUM SERPL-MCNC: 9.5 MG/DL (ref 8.6–10.3)
CHLORIDE SERPL-SCNC: 106 MMOL/L (ref 98–107)
CO2 SERPL-SCNC: 20 MMOL/L (ref 21–32)
COLOR UR: ABNORMAL
CREAT SERPL-MCNC: 1.42 MG/DL (ref 0.5–1.3)
EGFRCR SERPLBLD CKD-EPI 2021: 55 ML/MIN/1.73M*2
EOSINOPHIL # BLD AUTO: 0.03 X10*3/UL (ref 0–0.7)
EOSINOPHIL NFR BLD AUTO: 0.5 %
ERYTHROCYTE [DISTWIDTH] IN BLOOD BY AUTOMATED COUNT: 14.2 % (ref 11.5–14.5)
GLUCOSE SERPL-MCNC: 123 MG/DL (ref 74–99)
GLUCOSE UR STRIP.AUTO-MCNC: NORMAL MG/DL
HCT VFR BLD AUTO: 48.8 % (ref 41–52)
HGB BLD-MCNC: 16 G/DL (ref 13.5–17.5)
HYALINE CASTS #/AREA URNS AUTO: ABNORMAL /LPF
IMM GRANULOCYTES # BLD AUTO: 0.03 X10*3/UL (ref 0–0.7)
IMM GRANULOCYTES NFR BLD AUTO: 0.5 % (ref 0–0.9)
KETONES UR STRIP.AUTO-MCNC: NEGATIVE MG/DL
LACTATE SERPL-SCNC: 1.1 MMOL/L (ref 0.4–2)
LEUKOCYTE ESTERASE UR QL STRIP.AUTO: NEGATIVE
LIPASE SERPL-CCNC: 14 U/L (ref 9–82)
LYMPHOCYTES # BLD AUTO: 0.77 X10*3/UL (ref 1.2–4.8)
LYMPHOCYTES NFR BLD AUTO: 12.3 %
MAGNESIUM SERPL-MCNC: 2.12 MG/DL (ref 1.6–2.4)
MCH RBC QN AUTO: 30.2 PG (ref 26–34)
MCHC RBC AUTO-ENTMCNC: 32.8 G/DL (ref 32–36)
MCV RBC AUTO: 92 FL (ref 80–100)
MONOCYTES # BLD AUTO: 0.8 X10*3/UL (ref 0.1–1)
MONOCYTES NFR BLD AUTO: 12.8 %
MUCOUS THREADS #/AREA URNS AUTO: ABNORMAL /LPF
NEUTROPHILS # BLD AUTO: 4.6 X10*3/UL (ref 1.2–7.7)
NEUTROPHILS NFR BLD AUTO: 73.6 %
NITRITE UR QL STRIP.AUTO: NEGATIVE
NRBC BLD-RTO: 0 /100 WBCS (ref 0–0)
PH UR STRIP.AUTO: 5.5 [PH]
PLATELET # BLD AUTO: 193 X10*3/UL (ref 150–450)
POTASSIUM SERPL-SCNC: 4.9 MMOL/L (ref 3.5–5.3)
PROT SERPL-MCNC: 7.6 G/DL (ref 6.4–8.2)
PROT UR STRIP.AUTO-MCNC: NEGATIVE MG/DL
RBC # BLD AUTO: 5.3 X10*6/UL (ref 4.5–5.9)
RBC # UR STRIP.AUTO: ABNORMAL MG/DL
RBC #/AREA URNS AUTO: ABNORMAL /HPF
SODIUM SERPL-SCNC: 137 MMOL/L (ref 136–145)
SP GR UR STRIP.AUTO: >1.05
SQUAMOUS #/AREA URNS AUTO: ABNORMAL /HPF
UROBILINOGEN UR STRIP.AUTO-MCNC: NORMAL MG/DL
WBC # BLD AUTO: 6.3 X10*3/UL (ref 4.4–11.3)
WBC #/AREA URNS AUTO: ABNORMAL /HPF

## 2025-04-19 PROCEDURE — 99285 EMERGENCY DEPT VISIT HI MDM: CPT | Mod: 25 | Performed by: STUDENT IN AN ORGANIZED HEALTH CARE EDUCATION/TRAINING PROGRAM

## 2025-04-19 PROCEDURE — 83605 ASSAY OF LACTIC ACID: CPT | Performed by: STUDENT IN AN ORGANIZED HEALTH CARE EDUCATION/TRAINING PROGRAM

## 2025-04-19 PROCEDURE — 87506 IADNA-DNA/RNA PROBE TQ 6-11: CPT | Mod: PORLAB | Performed by: STUDENT IN AN ORGANIZED HEALTH CARE EDUCATION/TRAINING PROGRAM

## 2025-04-19 PROCEDURE — 36415 COLL VENOUS BLD VENIPUNCTURE: CPT | Performed by: STUDENT IN AN ORGANIZED HEALTH CARE EDUCATION/TRAINING PROGRAM

## 2025-04-19 PROCEDURE — 96361 HYDRATE IV INFUSION ADD-ON: CPT

## 2025-04-19 PROCEDURE — 87493 C DIFF AMPLIFIED PROBE: CPT | Performed by: STUDENT IN AN ORGANIZED HEALTH CARE EDUCATION/TRAINING PROGRAM

## 2025-04-19 PROCEDURE — 83690 ASSAY OF LIPASE: CPT | Performed by: STUDENT IN AN ORGANIZED HEALTH CARE EDUCATION/TRAINING PROGRAM

## 2025-04-19 PROCEDURE — 85025 COMPLETE CBC W/AUTO DIFF WBC: CPT | Performed by: STUDENT IN AN ORGANIZED HEALTH CARE EDUCATION/TRAINING PROGRAM

## 2025-04-19 PROCEDURE — 83735 ASSAY OF MAGNESIUM: CPT | Performed by: STUDENT IN AN ORGANIZED HEALTH CARE EDUCATION/TRAINING PROGRAM

## 2025-04-19 PROCEDURE — 2550000001 HC RX 255 CONTRASTS: Mod: JZ | Performed by: STUDENT IN AN ORGANIZED HEALTH CARE EDUCATION/TRAINING PROGRAM

## 2025-04-19 PROCEDURE — 74177 CT ABD & PELVIS W/CONTRAST: CPT

## 2025-04-19 PROCEDURE — 2500000004 HC RX 250 GENERAL PHARMACY W/ HCPCS (ALT 636 FOR OP/ED): Mod: JZ | Performed by: STUDENT IN AN ORGANIZED HEALTH CARE EDUCATION/TRAINING PROGRAM

## 2025-04-19 PROCEDURE — 74177 CT ABD & PELVIS W/CONTRAST: CPT | Mod: FOREIGN READ | Performed by: RADIOLOGY

## 2025-04-19 PROCEDURE — 80053 COMPREHEN METABOLIC PANEL: CPT | Performed by: STUDENT IN AN ORGANIZED HEALTH CARE EDUCATION/TRAINING PROGRAM

## 2025-04-19 PROCEDURE — 81001 URINALYSIS AUTO W/SCOPE: CPT | Performed by: STUDENT IN AN ORGANIZED HEALTH CARE EDUCATION/TRAINING PROGRAM

## 2025-04-19 PROCEDURE — 96360 HYDRATION IV INFUSION INIT: CPT

## 2025-04-19 RX ADMIN — IOHEXOL 75 ML: 350 INJECTION, SOLUTION INTRAVENOUS at 16:37

## 2025-04-19 RX ADMIN — SODIUM CHLORIDE, SODIUM LACTATE, POTASSIUM CHLORIDE, AND CALCIUM CHLORIDE 1000 ML: .6; .31; .03; .02 INJECTION, SOLUTION INTRAVENOUS at 15:50

## 2025-04-19 ASSESSMENT — COLUMBIA-SUICIDE SEVERITY RATING SCALE - C-SSRS
1. IN THE PAST MONTH, HAVE YOU WISHED YOU WERE DEAD OR WISHED YOU COULD GO TO SLEEP AND NOT WAKE UP?: NO
6. HAVE YOU EVER DONE ANYTHING, STARTED TO DO ANYTHING, OR PREPARED TO DO ANYTHING TO END YOUR LIFE?: NO
2. HAVE YOU ACTUALLY HAD ANY THOUGHTS OF KILLING YOURSELF?: NO

## 2025-04-19 ASSESSMENT — PAIN SCALES - GENERAL: PAINLEVEL_OUTOF10: 5 - MODERATE PAIN

## 2025-04-19 ASSESSMENT — PAIN DESCRIPTION - PAIN TYPE: TYPE: ACUTE PAIN

## 2025-04-19 ASSESSMENT — PAIN DESCRIPTION - DESCRIPTORS: DESCRIPTORS: ACHING;PRESSURE

## 2025-04-19 ASSESSMENT — PAIN DESCRIPTION - ONSET: ONSET: ONGOING

## 2025-04-19 ASSESSMENT — PAIN DESCRIPTION - PROGRESSION: CLINICAL_PROGRESSION: NOT CHANGED

## 2025-04-19 ASSESSMENT — PAIN DESCRIPTION - LOCATION: LOCATION: ABDOMEN

## 2025-04-19 ASSESSMENT — PAIN - FUNCTIONAL ASSESSMENT: PAIN_FUNCTIONAL_ASSESSMENT: 0-10

## 2025-04-19 ASSESSMENT — PAIN DESCRIPTION - FREQUENCY: FREQUENCY: CONSTANT/CONTINUOUS

## 2025-04-19 NOTE — ED PROVIDER NOTES
"HPI   Chief Complaint   Patient presents with    Diarrhea     Pt had \"intense vomiting and dirrahea since Wednesday.\"  Pt denies abx usage.     Vomiting    Abdominal Pain     Pt describes abdominal cramping and pain.        This is a 65-year-old male who is on Prilosec presents to the emergency department for multiple episodes of diarrhea over the past 3 days.  He states has had some abdominal cramping intermittently and had 1 episode of vomiting today when he tried to eat tomato soup.  No other issues otherwise.                          Padmini Coma Scale Score: 15                  Patient History   Medical History[1]  Surgical History[2]  Family History[3]  Social History[4]    Physical Exam   ED Triage Vitals [04/19/25 1509]   Temperature Heart Rate Respirations BP   37.2 °C (99 °F) 89 15 110/77      Pulse Ox Temp src Heart Rate Source Patient Position   95 % -- -- --      BP Location FiO2 (%)     -- --       Physical Exam  Constitutional:       General: He is not in acute distress.  HENT:      Head: Normocephalic and atraumatic.      Right Ear: Tympanic membrane normal.      Left Ear: Tympanic membrane normal.      Mouth/Throat:      Mouth: Mucous membranes are moist.   Eyes:      Extraocular Movements: Extraocular movements intact.      Conjunctiva/sclera: Conjunctivae normal.      Pupils: Pupils are equal, round, and reactive to light.   Cardiovascular:      Rate and Rhythm: Normal rate and regular rhythm.      Heart sounds: No murmur heard.  Pulmonary:      Effort: Pulmonary effort is normal. No respiratory distress.      Breath sounds: Normal breath sounds. No stridor. No wheezing or rales.   Abdominal:      General: Bowel sounds are normal. There is no distension.      Tenderness: There is no abdominal tenderness. There is no guarding or rebound.   Musculoskeletal:         General: No swelling, tenderness or deformity. Normal range of motion.   Skin:     General: Skin is warm and dry.      Coloration: Skin " is not jaundiced.      Findings: No bruising or lesion.   Neurological:      General: No focal deficit present.      Mental Status: He is alert and oriented to person, place, and time. Mental status is at baseline.      Cranial Nerves: No cranial nerve deficit.      Motor: No weakness.   Psychiatric:         Mood and Affect: Mood normal.       Labs Reviewed - No data to display  No orders to display       ED Course & MDM   ED Course as of 04/19/25 1835   Sat Apr 19, 2025   1730 IMPRESSION:  1.Findings suggestive of a mild gastroenteritis.  2.Mild urinary bladder wall thickening versus underdistention.  Correlate with urinalysis for possible cystitis.  3.Hepatic steatosis.  4.Colonic diverticulosis without findings of diverticulitis.  5.Mildly enlarged prostate.   [CF]      ED Course User Index  [CF] Lori May MD       Medical Decision Making  This is a 65-year-old who presents to the emergency department for 1 episode of vomiting, multiple episodes of diarrhea over the past couple days and some abdominal cramping.  Slight tenderness palpation of his abdomen on exam.  No leukocytosis or left shift creatinine is slightly elevated but is not an AMY.  He was given fluid bolus here.  Otherwise electrolytes are within normal limits.  No signs of elevation in his LFTs or any biliary pathology.  Lipase is negative less likely pancreatitis.  Urine does not appear infected.  C. difficile is negative.  CT of his abdomen shows concerns of mild gastroenteritis.  SPECT that this is more viral.  He is not having nausea is able to tolerate p.o.  At this time he is safe for discharge home and verbalizes today's return precautions.        Procedure  Procedures       [1]   Past Medical History:  Diagnosis Date    Depression     Dysphagia     Hiatal hernia     Hypothyroid    [2]   Past Surgical History:  Procedure Laterality Date    EXCISION / REPAIR HYDROCELE PEDIATRIC      TONSILLECTOMY     [3]   Family History  Problem  Relation Name Age of Onset    Colon cancer Mother      Swallowing difficulties Mother's Sister      Swallowing difficulties Maternal Grandmother     [4]   Social History  Tobacco Use    Smoking status: Every Day     Current packs/day: 1.00     Types: Cigarettes    Smokeless tobacco: Never   Vaping Use    Vaping status: Some Days    Substances: Nicotine    Devices: Refillable tank   Substance Use Topics    Alcohol use: Not Currently    Drug use: Not Currently        Lori May MD  04/19/25 5989

## 2025-04-20 LAB
C COLI+JEJ+UPSA DNA STL QL NAA+PROBE: NOT DETECTED
EC STX1 GENE STL QL NAA+PROBE: NOT DETECTED
EC STX2 GENE STL QL NAA+PROBE: NOT DETECTED
NOROVIRUS GI + GII RNA STL NAA+PROBE: NOT DETECTED
RV RNA STL NAA+PROBE: DETECTED
SALMONELLA DNA STL QL NAA+PROBE: NOT DETECTED
SHIGELLA DNA SPEC QL NAA+PROBE: NOT DETECTED
V CHOLERAE DNA STL QL NAA+PROBE: NOT DETECTED
Y ENTEROCOL DNA STL QL NAA+PROBE: NOT DETECTED

## 2025-04-21 ENCOUNTER — TELEPHONE (OUTPATIENT)
Dept: PHARMACY | Facility: HOSPITAL | Age: 65
End: 2025-04-21

## 2025-04-21 ENCOUNTER — APPOINTMENT (OUTPATIENT)
Dept: OPHTHALMOLOGY | Facility: CLINIC | Age: 65
End: 2025-04-21
Payer: COMMERCIAL

## 2025-04-21 DIAGNOSIS — H33.021 RETINAL DETACHMENT OF RIGHT EYE WITH MULTIPLE BREAKS: Primary | ICD-10-CM

## 2025-04-21 PROCEDURE — 99213 OFFICE O/P EST LOW 20 MIN: CPT

## 2025-04-21 PROCEDURE — 92134 CPTRZ OPH DX IMG PST SGM RTA: CPT

## 2025-04-21 ASSESSMENT — TONOMETRY
OD_IOP_MMHG: 15
OS_IOP_MMHG: 16
IOP_METHOD: GOLDMANN APPLANATION

## 2025-04-21 ASSESSMENT — ENCOUNTER SYMPTOMS
NEUROLOGICAL NEGATIVE: 0
PSYCHIATRIC NEGATIVE: 0
RESPIRATORY NEGATIVE: 0
MUSCULOSKELETAL NEGATIVE: 0
EYES NEGATIVE: 0
ENDOCRINE NEGATIVE: 0
ALLERGIC/IMMUNOLOGIC NEGATIVE: 0
HEMATOLOGIC/LYMPHATIC NEGATIVE: 0
CARDIOVASCULAR NEGATIVE: 0
GASTROINTESTINAL NEGATIVE: 0
CONSTITUTIONAL NEGATIVE: 0

## 2025-04-21 ASSESSMENT — VISUAL ACUITY
OD_PH_SC: 20/40
OS_PH_SC: 20/25
OD_SC: 20/80
OS_SC: 20/30
METHOD: SNELLEN - LINEAR

## 2025-04-21 ASSESSMENT — CUP TO DISC RATIO
OD_RATIO: 0.3
OS_RATIO: 0.3

## 2025-04-21 ASSESSMENT — EXTERNAL EXAM - RIGHT EYE: OD_EXAM: NORMAL

## 2025-04-21 ASSESSMENT — EXTERNAL EXAM - LEFT EYE: OS_EXAM: NORMAL

## 2025-04-21 ASSESSMENT — SLIT LAMP EXAM - LIDS
COMMENTS: NORMAL
COMMENTS: NORMAL

## 2025-04-21 NOTE — PROGRESS NOTES
"EDPD Note: Rapid Result Review    I reviewed Harvinder Rider \"Darian\" 's chart regarding a positive stool culture/result that was taken during their recent emergency room visit. The patient was told about these results prior to leaving the emergency department. Therefore, patient was contacted and given appropriate education.     Pt seen in ED for multiple episodes of diarrhea. Pt tested positive for rotovirus. Advised supportive care.     No results found for the last 90 days.    Admission on 04/19/2025, Discharged on 04/19/2025   Component Date Value Ref Range Status    WBC 04/19/2025 6.3  4.4 - 11.3 x10*3/uL Final    nRBC 04/19/2025 0.0  0.0 - 0.0 /100 WBCs Final    RBC 04/19/2025 5.30  4.50 - 5.90 x10*6/uL Final    Hemoglobin 04/19/2025 16.0  13.5 - 17.5 g/dL Final    Hematocrit 04/19/2025 48.8  41.0 - 52.0 % Final    MCV 04/19/2025 92  80 - 100 fL Final    MCH 04/19/2025 30.2  26.0 - 34.0 pg Final    MCHC 04/19/2025 32.8  32.0 - 36.0 g/dL Final    RDW 04/19/2025 14.2  11.5 - 14.5 % Final    Platelets 04/19/2025 193  150 - 450 x10*3/uL Final    Neutrophils % 04/19/2025 73.6  40.0 - 80.0 % Final    Immature Granulocytes %, Automated 04/19/2025 0.5  0.0 - 0.9 % Final    Immature Granulocyte Count (IG) includes promyelocytes, myelocytes and metamyelocytes but does not include bands. Percent differential counts (%) should be interpreted in the context of the absolute cell counts (cells/UL).    Lymphocytes % 04/19/2025 12.3  13.0 - 44.0 % Final    Monocytes % 04/19/2025 12.8  2.0 - 10.0 % Final    Eosinophils % 04/19/2025 0.5  0.0 - 6.0 % Final    Basophils % 04/19/2025 0.3  0.0 - 2.0 % Final    Neutrophils Absolute 04/19/2025 4.60  1.20 - 7.70 x10*3/uL Final    Percent differential counts (%) should be interpreted in the context of the absolute cell counts (cells/uL).    Immature Granulocytes Absolute, Au* 04/19/2025 0.03  0.00 - 0.70 x10*3/uL Final    Lymphocytes Absolute 04/19/2025 0.77 (L)  1.20 - 4.80 x10*3/uL " Final    Monocytes Absolute 04/19/2025 0.80  0.10 - 1.00 x10*3/uL Final    Eosinophils Absolute 04/19/2025 0.03  0.00 - 0.70 x10*3/uL Final    Basophils Absolute 04/19/2025 0.02  0.00 - 0.10 x10*3/uL Final    Glucose 04/19/2025 123 (H)  74 - 99 mg/dL Final    Sodium 04/19/2025 137  136 - 145 mmol/L Final    Potassium 04/19/2025 4.9  3.5 - 5.3 mmol/L Final    MILD HEMOLYSIS DETECTED. The result may be falsely elevated due to hemolysis or other interferents. Clinical correlation is recommended. Repeat testing may be considered.    Chloride 04/19/2025 106  98 - 107 mmol/L Final    Bicarbonate 04/19/2025 20 (L)  21 - 32 mmol/L Final    Anion Gap 04/19/2025 16  10 - 20 mmol/L Final    Urea Nitrogen 04/19/2025 28 (H)  6 - 23 mg/dL Final    Creatinine 04/19/2025 1.42 (H)  0.50 - 1.30 mg/dL Final    eGFR 04/19/2025 55 (L)  >60 mL/min/1.73m*2 Final    Calculations of estimated GFR are performed using the 2021 CKD-EPI Study Refit equation without the race variable for the IDMS-Traceable creatinine methods.  https://jasn.asnjournals.org/content/early/2021/09/22/ASN.5433786973    Calcium 04/19/2025 9.5  8.6 - 10.3 mg/dL Final    Albumin 04/19/2025 4.7  3.4 - 5.0 g/dL Final    Alkaline Phosphatase 04/19/2025 77  33 - 136 U/L Final    Total Protein 04/19/2025 7.6  6.4 - 8.2 g/dL Final    AST 04/19/2025 26  9 - 39 U/L Final    MILD HEMOLYSIS DETECTED. The result may be falsely elevated due to hemolysis or other interferents. Clinical correlation is recommended. Repeat testing may be considered.    Bilirubin, Total 04/19/2025 0.5  0.0 - 1.2 mg/dL Final    ALT 04/19/2025 17  10 - 52 U/L Final    Patients treated with Sulfasalazine may generate falsely decreased results for ALT.    Magnesium 04/19/2025 2.12  1.60 - 2.40 mg/dL Final    Lipase 04/19/2025 14  9 - 82 U/L Final    Lactate 04/19/2025 1.1  0.4 - 2.0 mmol/L Final    Campylobacter Group 04/19/2025 Not Detected  Not Detected Final    Salmonella species 04/19/2025 Not Detected   Not Detected Final    Shigella species 04/19/2025 Not Detected  Not Detected Final    Vibrio Group 04/19/2025 Not Detected  Not Detected Final    Yersinia Enterocolitica 04/19/2025 Not Detected  Not Detected Final    Shiga Toxin 1 04/19/2025 Not Detected  Not Detected Final    Shiga Toxin 2 04/19/2025 Not Detected  Not Detected Final    Norovirus GI/GII 04/19/2025 Not Detected  Not Detected Final    Rotavirus A 04/19/2025 Detected (A)  Not Detected Final    C. difficile, PCR 04/19/2025 Not Detected  Not Detected Final    Color, Urine 04/19/2025 Light-Yellow  Light-Yellow, Yellow, Dark-Yellow Final    Appearance, Urine 04/19/2025 Clear  Clear Final    Specific Gravity, Urine 04/19/2025 >1.050 (N)  1.005 - 1.035 Final    Specific gravity of >1.050 may be falsely elevated due to interfering substances (e.g. radiopaque contrast dye, mannitol). If clinically necessary, an alternate test method is available by calling the laboratory within 24 hours of specimen collection.    pH, Urine 04/19/2025 5.5  5.0, 5.5, 6.0, 6.5, 7.0, 7.5, 8.0 Final    Protein, Urine 04/19/2025 NEGATIVE  NEGATIVE, 10 (TRACE), 20 (TRACE) mg/dL Final    Glucose, Urine 04/19/2025 Normal  Normal mg/dL Final    Blood, Urine 04/19/2025 0.1 (1+) (A)  NEGATIVE mg/dL Final    Ketones, Urine 04/19/2025 NEGATIVE  NEGATIVE mg/dL Final    Bilirubin, Urine 04/19/2025 NEGATIVE  NEGATIVE mg/dL Final    Urobilinogen, Urine 04/19/2025 Normal  Normal mg/dL Final    Nitrite, Urine 04/19/2025 NEGATIVE  NEGATIVE Final    Leukocyte Esterase, Urine 04/19/2025 NEGATIVE  NEGATIVE Final    WBC, Urine 04/19/2025 1-5  1-5, NONE /HPF Final    RBC, Urine 04/19/2025 3-5  NONE, 1-2, 3-5 /HPF Final    Squamous Epithelial Cells, Urine 04/19/2025 1-9 (SPARSE)  Reference range not established. /HPF Final    Mucus, Urine 04/19/2025 FEW  Reference range not established. /LPF Final    Hyaline Casts, Urine 04/19/2025 1+ (A)  NONE /LPF Final       No further follow up needed from EDPD  Team.     If there are any other questions for the ED Post-Discharge Culture Follow Up Team, please contact [phone]. Fax: [phone].    Yuly Cruz RPh

## 2025-04-21 NOTE — PROGRESS NOTES
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 No IRF or SRF    - Left eye (OS): Normal foveal contour, intact RPE  outer and inner retinal layers. No IRF or SRF     Impression  Stable  Observe    Phacodensis bilateral  - ? Marfan syndrome  - Tall with long extremities   - High arched palate, RD, IOL tremulous   - Genetic testing done  - RTC in 3 months

## 2025-06-12 ENCOUNTER — APPOINTMENT (OUTPATIENT)
Dept: OPHTHALMOLOGY | Facility: CLINIC | Age: 65
End: 2025-06-12
Payer: COMMERCIAL

## 2025-07-01 ENCOUNTER — APPOINTMENT (OUTPATIENT)
Dept: CARDIOLOGY | Facility: HOSPITAL | Age: 65
End: 2025-07-01
Payer: COMMERCIAL

## 2025-07-16 ENCOUNTER — APPOINTMENT (OUTPATIENT)
Dept: CARDIOLOGY | Facility: HOSPITAL | Age: 65
End: 2025-07-16
Payer: COMMERCIAL

## 2025-07-21 ENCOUNTER — APPOINTMENT (OUTPATIENT)
Dept: OPHTHALMOLOGY | Facility: CLINIC | Age: 65
End: 2025-07-21
Payer: COMMERCIAL

## 2025-07-21 DIAGNOSIS — H33.021 RETINAL DETACHMENT OF RIGHT EYE WITH MULTIPLE BREAKS: Primary | ICD-10-CM

## 2025-07-21 PROCEDURE — 99213 OFFICE O/P EST LOW 20 MIN: CPT

## 2025-07-21 PROCEDURE — 92134 CPTRZ OPH DX IMG PST SGM RTA: CPT

## 2025-07-21 ASSESSMENT — SLIT LAMP EXAM - LIDS
COMMENTS: NORMAL
COMMENTS: NORMAL

## 2025-07-21 ASSESSMENT — EXTERNAL EXAM - LEFT EYE: OS_EXAM: NORMAL

## 2025-07-21 ASSESSMENT — VISUAL ACUITY
OD_SC+: -1
OD_PH_SC: 20/30
METHOD: SNELLEN - LINEAR
OD_PH_SC+: -1
OD_SC: 20/40
OS_SC: 20/25

## 2025-07-21 ASSESSMENT — TONOMETRY
OS_IOP_MMHG: 14
IOP_METHOD: GOLDMANN APPLANATION

## 2025-07-21 ASSESSMENT — EXTERNAL EXAM - RIGHT EYE: OD_EXAM: NORMAL

## 2025-07-21 ASSESSMENT — CUP TO DISC RATIO
OD_RATIO: 0.3
OS_RATIO: 0.3

## 2025-07-21 NOTE — PROGRESS NOTES
[facility] Cardiology Discharge Summary     Patient ID:  Christi Andrade  [id number]  89 y.o.  4/19/1928    Admit date: 8/19/2024    Discharge date:  8/21/2024    Admitting Physician: Bari Morales MD     Discharge Physician: Dr. Spaulding    Admission Diagnoses: Bradycardia [R00.1]  Symptomatic bradycardia [R00.1]    Discharge Diagnoses:   Patient Active Problem List    Diagnosis    Symptomatic bradycardia       Cardiology Procedures this admission:   echocardiogram, PM implantation, CXR  Consults: EP    Hospital Course: Patient presented to the ER from PCP office with symptomatic bradycardia. Patient was admitted to telemetry for PM implantation.     Echocardiogram was done and showed:     Left Ventricle: Normal left ventricular systolic function with a visually estimated EF of 65 - 70%. Left ventricle size is normal. Mildly increased wall thickness. Normal wall motion. Abnormal diastolic function.    Aortic Valve: Mildly calcified cusps. Mild stenosis of the aortic valve. AV mean gradient is 13 mmHg. AV peak gradient is 22 mmHg. AV area by continuity VTI is 1.6 cm2.    Mitral Valve: Mild regurgitation.    Tricuspid Valve: Mild regurgitation. The estimated RVSP is 64 mmHg.    Left Atrium: Left atrium is moderately dilated.    Right Atrium: Right atrium is mildly dilated.    Aorta: Normal sized aortic root. Ao root diameter is 2.7 cm.    Pericardium: Small (<1 cm) pericardial effusion present. No indication of cardiac tamponade.    EP was consulted for placement of wireless device. Patient was taken to the EP lab and underwent successful implantation of [employer] MICRA leadless PM by Dr. Spaulding. Patient tolerated the procedure well and was taken to the telemetry floor for recovery. Blood pressure remained elevated therefore she was started on anti-hypertensive medications. Follow up chest xray showed no pneumothorax. 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 No IRF or SRF    - Left eye (OS): Normal foveal contour, intact RPE  outer and inner retinal layers. No IRF or SRF     Impression  Stable  Observe    Phacodensis bilateral  - ? Marfan syndrome  - Tall with long extremities   - High arched palate, RD, IOL tremulous   - Genetic testing to rule out Marfan syndrome - Patient is scheduled for Echo  - RTC in 3 months

## 2025-07-23 ENCOUNTER — HOSPITAL ENCOUNTER (OUTPATIENT)
Dept: CARDIOLOGY | Facility: HOSPITAL | Age: 65
Discharge: HOME | End: 2025-07-23
Payer: COMMERCIAL

## 2025-07-23 DIAGNOSIS — Z82.49 FAMILY HISTORY OF ISCHEMIC HEART DISEASE AND OTHER DISEASES OF THE CIRCULATORY SYSTEM: ICD-10-CM

## 2025-07-23 DIAGNOSIS — M35.9 SYSTEMIC INVOLVEMENT OF CONNECTIVE TISSUE, UNSPECIFIED: ICD-10-CM

## 2025-07-23 LAB
AORTIC VALVE MEAN GRADIENT: 2 MMHG
AORTIC VALVE PEAK VELOCITY: 0.91 M/S
AV PEAK GRADIENT: 3 MMHG
AVA (PEAK VEL): 3.26 CM2
AVA (VTI): 3.04 CM2
EJECTION FRACTION APICAL 4 CHAMBER: 53.5
EJECTION FRACTION: 48 %
GLOBAL LONGITUDINAL STRAIN: 15.3 %
LEFT ATRIUM VOLUME AREA LENGTH INDEX BSA: 25.7 ML/M2
LEFT VENTRICLE INTERNAL DIMENSION DIASTOLE: 4.98 CM (ref 3.5–6)
LEFT VENTRICULAR OUTFLOW TRACT DIAMETER: 2.18 CM
LV EJECTION FRACTION BIPLANE: 53 %
MITRAL VALVE E/A RATIO: 1.07
RIGHT VENTRICLE FREE WALL PEAK S': 13.24 CM/S
TRICUSPID ANNULAR PLANE SYSTOLIC EXCURSION: 2.3 CM

## 2025-07-23 PROCEDURE — 93356 MYOCRD STRAIN IMG SPCKL TRCK: CPT | Performed by: INTERNAL MEDICINE

## 2025-07-23 PROCEDURE — 93306 TTE W/DOPPLER COMPLETE: CPT

## 2025-07-23 PROCEDURE — 93306 TTE W/DOPPLER COMPLETE: CPT | Performed by: INTERNAL MEDICINE

## 2025-10-20 ENCOUNTER — APPOINTMENT (OUTPATIENT)
Dept: OPHTHALMOLOGY | Facility: CLINIC | Age: 65
End: 2025-10-20
Payer: COMMERCIAL

## (undated) DEVICE — NEEDLE, HYPODERMIC, REGULAR WALL, REGULAR BEVEL, 27 G X 0.5 IN

## (undated) DEVICE — DRESSING, TRANSPARENT, TEGADERM, 4 X 4-3/4 IN

## (undated) DEVICE — MAYO TRAY, SMALL

## (undated) DEVICE — MANIFOLD, 4 PORT NEPTUNE STANDARD

## (undated) DEVICE — PROBE, DIATHERMY 25G

## (undated) DEVICE — SYRINGE, HYPODERMIC, LUER LOCK, 6 CC

## (undated) DEVICE — Device

## (undated) DEVICE — COVER, MAYO STAND, W/PAD, 23 IN, DISPOSABLE, PLASTIC, LF, STERILE

## (undated) DEVICE — KIT, CONSTELLATION, 25G VITRECTOMY

## (undated) DEVICE — REST, HEAD, BAGEL, 9 IN

## (undated) DEVICE — BIOM, OPTIC, HD PROFESSIONAL F/F=200MM

## (undated) DEVICE — SYRINGE, HYPODERMIC, TB, W/O NEEDLE, 1 CC, SLIP TIP

## (undated) DEVICE — CANNULA, 27G X 22MM, ANTERIOR, CHAMBER, RYCROFT

## (undated) DEVICE — AUTO GAS FULL, CONSTELLATION

## (undated) DEVICE — SYRINGE, MONOJECT, LUER LOCK, 3 CC, LF

## (undated) DEVICE — DRESSING, TRANSPARENT, TEGADERM, 2-3/8 X 2-3/4 IN

## (undated) DEVICE — PROBE, 25G ILLUMINATED FLEX CURVED LASER 2X W/RFID

## (undated) DEVICE — PAD, EYE, OVAL, 1 5/8 X 2 5/8 IN, STERILE

## (undated) DEVICE — SHIELD, EYE, UNIVERSAL, 2.5 X 3 IN

## (undated) DEVICE — MARKER, SKIN, RULER AND LABEL PACK, CUSTOM

## (undated) DEVICE — GOWN, ASTOUND, L

## (undated) DEVICE — COVER, CART, 45 X 27 X 48 IN, CLEAR

## (undated) DEVICE — NEEDLE, HYPODERMIC, REGULAR WALL, REGULAR BEVEL, 30 G X 0.5 IN

## (undated) DEVICE — CYLINDER, C3F8 GAS

## (undated) DEVICE — SUTURE, VICRYL, 7-0, 18 IN, TG1608, DA, VIOLET